# Patient Record
Sex: MALE | Race: BLACK OR AFRICAN AMERICAN | NOT HISPANIC OR LATINO | Employment: OTHER | URBAN - METROPOLITAN AREA
[De-identification: names, ages, dates, MRNs, and addresses within clinical notes are randomized per-mention and may not be internally consistent; named-entity substitution may affect disease eponyms.]

---

## 2017-07-03 ENCOUNTER — HOSPITAL ENCOUNTER (INPATIENT)
Facility: HOSPITAL | Age: 62
LOS: 4 days | Discharge: HOME | DRG: 603 | End: 2017-07-07
Attending: FAMILY MEDICINE | Admitting: SURGERY
Payer: MEDICARE

## 2017-07-03 DIAGNOSIS — L02.213 ABSCESS OF CHEST WALL: ICD-10-CM

## 2017-07-03 DIAGNOSIS — L02.91 ABSCESS: ICD-10-CM

## 2017-07-03 DIAGNOSIS — D84.9 IMMUNOSUPPRESSED STATUS: ICD-10-CM

## 2017-07-03 DIAGNOSIS — L03.313 CELLULITIS OF CHEST WALL: Primary | ICD-10-CM

## 2017-07-03 DIAGNOSIS — C91.10 CLL (CHRONIC LYMPHOCYTIC LEUKEMIA): ICD-10-CM

## 2017-07-03 PROBLEM — E11.9 TYPE 2 DIABETES MELLITUS: Status: ACTIVE | Noted: 2017-07-03

## 2017-07-03 LAB
ALBUMIN SERPL BCP-MCNC: 4 G/DL
ALP SERPL-CCNC: 99 U/L
ALT SERPL W/O P-5'-P-CCNC: 47 U/L
ANION GAP SERPL CALC-SCNC: 12 MMOL/L
AST SERPL-CCNC: 34 U/L
BASOPHILS NFR BLD: 0 %
BILIRUB SERPL-MCNC: 1.6 MG/DL
BILIRUB UR QL STRIP: NEGATIVE
BUN SERPL-MCNC: 19 MG/DL
CALCIUM SERPL-MCNC: 9.1 MG/DL
CHLORIDE SERPL-SCNC: 103 MMOL/L
CLARITY UR REFRACT.AUTO: CLEAR
CO2 SERPL-SCNC: 24 MMOL/L
COLOR UR AUTO: YELLOW
CREAT SERPL-MCNC: 1.7 MG/DL
DIFFERENTIAL METHOD: ABNORMAL
EOSINOPHIL NFR BLD: 1 %
ERYTHROCYTE [DISTWIDTH] IN BLOOD BY AUTOMATED COUNT: 14.4 %
EST. GFR  (AFRICAN AMERICAN): 48.9 ML/MIN/1.73 M^2
EST. GFR  (NON AFRICAN AMERICAN): 42.3 ML/MIN/1.73 M^2
GLUCOSE SERPL-MCNC: 231 MG/DL
GLUCOSE UR QL STRIP: NEGATIVE
HCT VFR BLD AUTO: 35.1 %
HGB BLD-MCNC: 11.6 G/DL
HGB UR QL STRIP: ABNORMAL
KETONES UR QL STRIP: NEGATIVE
LEUKOCYTE ESTERASE UR QL STRIP: NEGATIVE
LYMPHOCYTES NFR BLD: 59 %
MCH RBC QN AUTO: 29.7 PG
MCHC RBC AUTO-ENTMCNC: 33 %
MCV RBC AUTO: 90 FL
MICROSCOPIC COMMENT: ABNORMAL
MONOCYTES NFR BLD: 4 %
NEUTROPHILS NFR BLD: 36 %
NITRITE UR QL STRIP: NEGATIVE
PH UR STRIP: 7 [PH] (ref 5–8)
PLATELET # BLD AUTO: 115 K/UL
PMV BLD AUTO: 12 FL
POCT GLUCOSE: 165 MG/DL (ref 70–110)
POTASSIUM SERPL-SCNC: 4.3 MMOL/L
PROT SERPL-MCNC: 6.7 G/DL
PROT UR QL STRIP: NEGATIVE
RBC # BLD AUTO: 3.9 M/UL
RBC #/AREA URNS AUTO: 5 /HPF (ref 0–4)
SODIUM SERPL-SCNC: 139 MMOL/L
SP GR UR STRIP: 1.01 (ref 1–1.03)
URN SPEC COLLECT METH UR: ABNORMAL
UROBILINOGEN UR STRIP-ACNC: 1 EU/DL
WBC # BLD AUTO: 12.07 K/UL
WBC #/AREA URNS AUTO: 2 /HPF (ref 0–5)

## 2017-07-03 PROCEDURE — 25000003 PHARM REV CODE 250: Performed by: FAMILY MEDICINE

## 2017-07-03 PROCEDURE — 25000003 PHARM REV CODE 250: Performed by: PHYSICIAN ASSISTANT

## 2017-07-03 PROCEDURE — 87077 CULTURE AEROBIC IDENTIFY: CPT

## 2017-07-03 PROCEDURE — 0J963ZZ DRAINAGE OF CHEST SUBCUTANEOUS TISSUE AND FASCIA, PERCUTANEOUS APPROACH: ICD-10-PCS | Performed by: SURGERY

## 2017-07-03 PROCEDURE — 85027 COMPLETE CBC AUTOMATED: CPT

## 2017-07-03 PROCEDURE — 63600175 PHARM REV CODE 636 W HCPCS: Performed by: PHYSICIAN ASSISTANT

## 2017-07-03 PROCEDURE — 11000001 HC ACUTE MED/SURG PRIVATE ROOM

## 2017-07-03 PROCEDURE — 87040 BLOOD CULTURE FOR BACTERIA: CPT

## 2017-07-03 PROCEDURE — 87186 SC STD MICRODIL/AGAR DIL: CPT

## 2017-07-03 PROCEDURE — 96368 THER/DIAG CONCURRENT INF: CPT

## 2017-07-03 PROCEDURE — 94761 N-INVAS EAR/PLS OXIMETRY MLT: CPT

## 2017-07-03 PROCEDURE — 85007 BL SMEAR W/DIFF WBC COUNT: CPT

## 2017-07-03 PROCEDURE — 25000003 PHARM REV CODE 250: Performed by: EMERGENCY MEDICINE

## 2017-07-03 PROCEDURE — 80053 COMPREHEN METABOLIC PANEL: CPT

## 2017-07-03 PROCEDURE — 99285 EMERGENCY DEPT VISIT HI MDM: CPT | Mod: 25

## 2017-07-03 PROCEDURE — 96365 THER/PROPH/DIAG IV INF INIT: CPT

## 2017-07-03 PROCEDURE — 36415 COLL VENOUS BLD VENIPUNCTURE: CPT

## 2017-07-03 PROCEDURE — 63600175 PHARM REV CODE 636 W HCPCS: Performed by: FAMILY MEDICINE

## 2017-07-03 PROCEDURE — 25000003 PHARM REV CODE 250

## 2017-07-03 PROCEDURE — 87070 CULTURE OTHR SPECIMN AEROBIC: CPT

## 2017-07-03 PROCEDURE — 96366 THER/PROPH/DIAG IV INF ADDON: CPT

## 2017-07-03 PROCEDURE — 81000 URINALYSIS NONAUTO W/SCOPE: CPT

## 2017-07-03 RX ORDER — ONDANSETRON 8 MG/1
8 TABLET, ORALLY DISINTEGRATING ORAL EVERY 8 HOURS PRN
Status: DISCONTINUED | OUTPATIENT
Start: 2017-07-03 | End: 2017-07-07 | Stop reason: HOSPADM

## 2017-07-03 RX ORDER — HYDROCODONE BITARTRATE AND ACETAMINOPHEN 5; 325 MG/1; MG/1
1 TABLET ORAL EVERY 4 HOURS PRN
Status: DISCONTINUED | OUTPATIENT
Start: 2017-07-03 | End: 2017-07-07 | Stop reason: HOSPADM

## 2017-07-03 RX ORDER — DORZOLAMIDE HYDROCHLORIDE AND TIMOLOL MALEATE 20; 5 MG/ML; MG/ML
1 SOLUTION/ DROPS OPHTHALMIC 2 TIMES DAILY
COMMUNITY

## 2017-07-03 RX ORDER — LIDOCAINE HYDROCHLORIDE 10 MG/ML
10 INJECTION INFILTRATION; PERINEURAL ONCE
Status: COMPLETED | OUTPATIENT
Start: 2017-07-03 | End: 2017-07-03

## 2017-07-03 RX ORDER — NAPROXEN SODIUM 220 MG/1
81 TABLET, FILM COATED ORAL DAILY
Status: DISCONTINUED | OUTPATIENT
Start: 2017-07-04 | End: 2017-07-07 | Stop reason: HOSPADM

## 2017-07-03 RX ORDER — IBUPROFEN 200 MG
16 TABLET ORAL
Status: DISCONTINUED | OUTPATIENT
Start: 2017-07-03 | End: 2017-07-05 | Stop reason: DRUGHIGH

## 2017-07-03 RX ORDER — IBUPROFEN 200 MG
24 TABLET ORAL
Status: DISCONTINUED | OUTPATIENT
Start: 2017-07-03 | End: 2017-07-05 | Stop reason: DRUGHIGH

## 2017-07-03 RX ORDER — GLUCAGON 1 MG
1 KIT INJECTION
Status: DISCONTINUED | OUTPATIENT
Start: 2017-07-03 | End: 2017-07-05 | Stop reason: DRUGHIGH

## 2017-07-03 RX ORDER — INSULIN ASPART 100 [IU]/ML
1-10 INJECTION, SOLUTION INTRAVENOUS; SUBCUTANEOUS
Status: DISCONTINUED | OUTPATIENT
Start: 2017-07-03 | End: 2017-07-05 | Stop reason: DRUGHIGH

## 2017-07-03 RX ORDER — AMLODIPINE BESYLATE 5 MG/1
10 TABLET ORAL DAILY
Status: DISCONTINUED | OUTPATIENT
Start: 2017-07-04 | End: 2017-07-07 | Stop reason: HOSPADM

## 2017-07-03 RX ORDER — ATORVASTATIN CALCIUM 20 MG/1
20 TABLET, FILM COATED ORAL DAILY
Status: DISCONTINUED | OUTPATIENT
Start: 2017-07-04 | End: 2017-07-07 | Stop reason: HOSPADM

## 2017-07-03 RX ORDER — DIPHENHYDRAMINE HYDROCHLORIDE 50 MG/ML
25 INJECTION INTRAMUSCULAR; INTRAVENOUS EVERY 4 HOURS PRN
Status: DISCONTINUED | OUTPATIENT
Start: 2017-07-03 | End: 2017-07-07 | Stop reason: HOSPADM

## 2017-07-03 RX ORDER — ENOXAPARIN SODIUM 100 MG/ML
40 INJECTION SUBCUTANEOUS
Status: DISCONTINUED | OUTPATIENT
Start: 2017-07-03 | End: 2017-07-07 | Stop reason: HOSPADM

## 2017-07-03 RX ORDER — SODIUM CHLORIDE 0.9 % (FLUSH) 0.9 %
3 SYRINGE (ML) INJECTION EVERY 8 HOURS
Status: DISCONTINUED | OUTPATIENT
Start: 2017-07-03 | End: 2017-07-07 | Stop reason: HOSPADM

## 2017-07-03 RX ORDER — ACETAMINOPHEN 325 MG/1
650 TABLET ORAL EVERY 8 HOURS PRN
Status: DISCONTINUED | OUTPATIENT
Start: 2017-07-03 | End: 2017-07-07 | Stop reason: HOSPADM

## 2017-07-03 RX ADMIN — PIPERACILLIN AND TAZOBACTAM 4.5 G: 4; .5 INJECTION, POWDER, FOR SOLUTION INTRAVENOUS at 03:07

## 2017-07-03 RX ADMIN — SODIUM CHLORIDE, PRESERVATIVE FREE 3 ML: 5 INJECTION INTRAVENOUS at 09:07

## 2017-07-03 RX ADMIN — VANCOMYCIN HYDROCHLORIDE 1 G: 1 INJECTION, POWDER, LYOPHILIZED, FOR SOLUTION INTRAVENOUS at 02:07

## 2017-07-03 RX ADMIN — LIDOCAINE HYDROCHLORIDE 10 ML: 10 INJECTION, SOLUTION INFILTRATION; PERINEURAL at 06:07

## 2017-07-03 NOTE — ED NOTES
Received from [facility], located within [facility], pt has [id number] area of raised redness with drainage, area includes nipple.. Area below is blistered from tape per pt..iv antibiotics completed pta, denies chills, + pain, Dr Bearden on phone with dr Ortiz resident.

## 2017-07-03 NOTE — ED PROVIDER NOTES
Encounter Date: 7/3/2017       History     Chief Complaint   Patient presents with    Abscess     Pt reports I&D to abscess under left axilla chest area on 6/29/2017. [facility] Pt reports fever.     HPI: 61 YO M with a  PMH of CLL, diabetes, gluacoma, HTN and hyperlipidemia presents to the ED with a CC of abscess to the chest wall. Patient states that he went to [facility] on 6/28/17 for any abscess to the left axilla/chest wall. The area was incised and drained in the ED and packed, states he has been taking Bactrim and removed the packing on 7/1 as instructed. [facility] and was sent to the ED due to worsening infection. Redness has extended on the chest wall, the area is continuing to drain and patient experiencing fever at home and night sweats. He denies any nausea or vomiting, no chest pain or shortness of breath. Patient is currently taking oral chemo for hx of CLL.           Review of patient's allergies indicates:  No Known Allergies  Past Medical History:   Diagnosis Date    CLL (chronic lymphocytic leukemia)     Diabetes mellitus     Glaucoma     High cholesterol     Hypertension      History reviewed. No pertinent surgical history.  History reviewed. No pertinent family history.  Social History   Substance Use Topics    Smoking status: Never Smoker    Smokeless tobacco: Never Used    Alcohol use No     Review of Systems   Constitutional: Positive for chills and fever.   HENT: Negative for ear discharge and sore throat.    Eyes: Negative for visual disturbance.   Respiratory: Negative for shortness of breath.    Cardiovascular: Negative for chest pain.   Gastrointestinal: Negative for abdominal pain, nausea and vomiting.   Genitourinary: Negative for dysuria.   Musculoskeletal: Negative for back pain.   Skin: Positive for wound.        See HPI   Neurological: Negative for dizziness, weakness and numbness.   Hematological:  Does not bruise/bleed easily.   All other systems reviewed and are negative.      Physical Exam     Initial Vitals [07/03/17 1323]   BP Pulse Resp Temp SpO2   (!) 142/68 86 18 97.7 °F (36.5 °C) 98 %      MAP       92.67         Physical Exam    Nursing note and vitals reviewed.  Constitutional: He appears well-developed and well-nourished. No distress.    male laying on exam bed   HENT:   Head: Normocephalic and atraumatic.   Right Ear: External ear normal.   Left Ear: External ear normal.   Mouth/Throat: Oropharynx is clear and moist.   Eyes: Conjunctivae and EOM are normal. Pupils are equal, round, and reactive to light.   Neck: Normal range of motion. Neck supple.   Cardiovascular: Normal rate, regular rhythm, normal heart sounds and intact distal pulses.   Pulmonary/Chest: Breath sounds normal. No respiratory distress. He has no wheezes. He exhibits tenderness.       Abdominal: Soft. Bowel sounds are normal. He exhibits no distension. There is no tenderness. There is no rebound and no guarding.   Musculoskeletal: Normal range of motion.   Lymphadenopathy:     He has no cervical adenopathy.   Neurological: He is alert and oriented to person, place, and time. He has normal strength. No sensory deficit.   Skin: Capillary refill takes less than 2 seconds.        Patient has a small incision to the left lateral chest wall just inferior to the left axilla draining minimally purulent drainage surrounded by indurated cellulitic tissue. Patient does have 3 small open areas just inferior to the cellulitis that appear to have been blisters that opened.          ED Course   Procedures  Labs Reviewed   CULTURE, BLOOD   CBC W/ AUTO DIFFERENTIAL   COMPREHENSIVE METABOLIC PANEL   URINALYSIS             Medical Decision Making:   Initial Assessment:   61 YO M with a  PMH of CLL, diabetes, gluacoma, HTN and hyperlipidemia presents to the ED with a CC of abscess to the chest wall. Patient states that he went to [facility] on 6/28/17 for any abscess to the left axilla/chest wall. The area was incised and drained in the ED and packed, states he has been taking Bactrim and removed the packing on 7/1 as instructed. [facility] and was sent to the ED due to worsening infection. Redness has extended on the chest wall, the area is continuing to drain and patient experiencing fever at home and night sweats. He denies any nausea or vomiting, no chest pain or shortness of breath. Patient is currently taking oral chemo for hx of CLL.  Differential Diagnosis:   Cellulitis, abscess, sepsis  Clinical Tests:   Lab Tests: Ordered  Radiological Study: Ordered  ED Management:  Lab work ordered. Patient failed outpatient management. Blood cultures drawn, patient is a candidate for inpatient IV antibiotics.  Other:   I have discussed this case with another health care provider.       <> Summary of the Discussion: Patient status is discussed with Dr. weaver over advised to contact surgery directly as they  directly admit patients.  Patient case is discussed with Dr. Fierro who accepted the patient to be transferred to [place] ER for evaluation.                   ED Course     Clinical Impression:   The primary encounter diagnosis was Axillary abscess. A diagnosis of Cellulitis of chest wall was also pertinent to this visit.    Disposition:   Disposition: Transferred  [place] ED for evaluation by surgery.                        Steven Das MD  07/03/17 2325

## 2017-07-03 NOTE — Clinical Note
Patient is accepted by Dr. Fierro for evaluation.  Advised to send to the ER for evaluation.  Dr. Bearden in [place] ER and notified about the patient.

## 2017-07-03 NOTE — ED TRIAGE NOTES
Pt reports I&D to abscess under left axilla chest area on 6/29/2017. [facility]. Pt reports fever.

## 2017-07-03 NOTE — CONSULTS
"[facility]  History & Physical  Surgery    SUBJECTIVE:     Chief Complaint/Reason for Admission: The patient is a 62 y.o. male Hx diabetes mod controlled with insulin and CLL on ibrutinib. Was doing well until last Wednesday, noticed a "pimple" on L chest. This began to swell and enlarge. An I&D was performed at OSH on 6/29 and he was sent out on bactrim. He removed the packing and dressing as instructed, but the swelling, pain, and erythema worsening and purulent drainage began to come from the previous incision. States he has subjective fevers at home. Denies chills, night sweats, Cp, SOB, abd pain.     History of Present Illness:  Review of patient's allergies indicates:  No Known Allergies       Past Medical History:   Diagnosis Date    CLL (chronic lymphocytic leukemia)     Diabetes mellitus     Glaucoma     High cholesterol     Hypertension       History reviewed. No pertinent surgical history.   History reviewed. No pertinent family history.   Social History   Substance Use Topics    Smoking status: Never Smoker    Smokeless tobacco: Never Used    Alcohol use No          Review of Systems:  Pertinent items are noted in HPI.    OBJECTIVE:     Vital signs in last 24 hours:  Temp:  [97.7 °F (36.5 °C)-98.5 °F (36.9 °C)] 98.5 °F (36.9 °C)  Pulse:  [75-86] 75  Resp:  [18] 18  SpO2:  [98 %-100 %] 100 %  BP: (128-142)/(68-75) 128/75    /75 (BP Location: Right arm, Patient Position: Sitting)   Pulse 75   Temp 98.5 °F (36.9 °C) (Oral)   Resp 18   Ht 6' 1" (1.854 m)   Wt 101.6 kg (224 lb)   SpO2 100%   BMI 29.55 kg/m²     General Appearance:    Alert, cooperative, no distress, appears stated age   Head:    Normocephalic, without obvious abnormality, atraumatic   Neck:   Supple, symmetrical, trachea midline, no adenopathy;        thyroid:  No enlargement/tenderness/nodules; no carotid    bruit or JVD   Back:     Symmetric, no curvature, ROM normal, no CVA tenderness   Lungs:     " Clear to auscultation bilaterally, respirations unlabored   Chest wall:    L chest 7cm x 3cm area of induration and fluctuance, surrounded by 10cm x 13cm area of erythema and cellulitis. 1cm incision with purulent drainage, no local lymph nodes palpable   Heart:    Regular rate and rhythm, S1 and S2 normal, no murmur, rub   or gallop   Abdomen:     Soft, non-tender, bowel sounds active all four quadrants,     no masses, no organomegaly   Extremities:   Extremities normal, atraumatic, no cyanosis or edema   Pulses:   2+ and symmetric all extremities   Skin:   Skin color, texture, turgor normal, no rashes or lesions   Lymph nodes:   Cervical, supraclavicular, and axillary nodes normal   Neurologic:   CNII-XII intact. Normal strength, sensation and reflexes       throughout         Labs:  WBC 12  H/H 11/35  Glucose 231  Cr 1.7  T bili 1.6  GFR 48.9    All other labs wnl    History  Exam  Assessment & Plan    Tony Harris  7/3/2017    ASSESSMENT/PLAN:   63yo M Hx DM and CLL with recurrent L chest wall abscess    1. Admit Surgery  2. IV Abx  3. Bedside I&D  4. Consents obtained  5. Diabetic diet  6. Pain control    Tony Harris MD

## 2017-07-03 NOTE — ED PROVIDER NOTES
Encounter Date: 7/3/2017       History     Chief Complaint   Patient presents with    Abscess     Pt reports I&D to abscess under left axilla chest area on 6/29/2017. [facility]. Pt reports fever.     Patient is a 62-year-old male transferred here from [facility] emergency department for evaluation of a left chest wall abscess.  Patient had an incision and drainage done a few days ago at [place] emergency department.  He presented to [facility] emergency department today and was then sent here.  Patient reports subjective fevers at home last night.  He is currently on Bactrim.        The history is provided by the patient and the EMS personnel.     Review of patient's allergies indicates:  No Known Allergies  Past Medical History:   Diagnosis Date    CLL (chronic lymphocytic leukemia)     Diabetes mellitus     Glaucoma     High cholesterol     Hypertension      History reviewed. No pertinent surgical history.  History reviewed. No pertinent family history.  Social History   Substance Use Topics    Smoking status: Never Smoker    Smokeless tobacco: Never Used    Alcohol use No     Review of Systems   Constitutional:        Subjective fever.   Respiratory: Negative for shortness of breath.    Gastrointestinal: Negative for vomiting.   Skin: Positive for color change and wound.       Physical Exam     Initial Vitals [07/03/17 1323]   BP Pulse Resp Temp SpO2   (!) 142/68 86 18 97.7 °F (36.5 °C) 98 %      MAP       92.67         Physical Exam    Nursing note and vitals reviewed.  Constitutional: No distress.   HENT:   Head: Normocephalic and atraumatic.   Eyes: EOM are normal.   Neck: Neck supple.   Cardiovascular: Normal rate, regular rhythm and normal heart sounds.   Pulmonary/Chest: Breath sounds normal.   Abdominal: Soft. There is no tenderness.   Musculoskeletal: Normal range of motion.   Neurological: He is alert and oriented to person, place, and time.   Skin:    Erythema and induration of the left chest wall.  (See picture below.)   Psychiatric: His behavior is normal. Thought content normal.             ED Course   Procedures  Labs Reviewed   CBC W/ AUTO DIFFERENTIAL - Abnormal; Notable for the following:        Result Value    RBC 3.90 (*)     Hemoglobin 11.6 (*)     Hematocrit 35.1 (*)     Platelets 115 (*)     Gran% 36.0 (*)     Lymph% 59.0 (*)     All other components within normal limits   COMPREHENSIVE METABOLIC PANEL - Abnormal; Notable for the following:     Glucose 231 (*)     Creatinine 1.70 (*)     Total Bilirubin 1.6 (*)     ALT 47 (*)     eGFR if  48.9 (*)     eGFR if non  42.3 (*)     All other components within normal limits   URINALYSIS - Abnormal; Notable for the following:     Occult Blood UA 1+ (*)     All other components within normal limits   URINALYSIS MICROSCOPIC - Abnormal; Notable for the following:     RBC, UA 5 (*)     All other components within normal limits   CULTURE, BLOOD   CULTURE, AEROBIC  (SPECIFY SOURCE)                               ED Course     Clinical Impression:   The primary encounter diagnosis was Cellulitis of chest wall. Diagnoses of Abscess of chest wall and Abscess were also pertinent to this visit.                           Esvin Bearden MD  07/04/17 0705

## 2017-07-04 PROBLEM — L02.213 ABSCESS OF CHEST WALL: Status: ACTIVE | Noted: 2017-07-04

## 2017-07-04 LAB
ANION GAP SERPL CALC-SCNC: 8 MMOL/L
ANISOCYTOSIS BLD QL SMEAR: SLIGHT
BASOPHILS # BLD AUTO: ABNORMAL K/UL
BASOPHILS NFR BLD: 0 %
BUN SERPL-MCNC: 20 MG/DL
CALCIUM SERPL-MCNC: 9.2 MG/DL
CHLORIDE SERPL-SCNC: 105 MMOL/L
CO2 SERPL-SCNC: 23 MMOL/L
CREAT SERPL-MCNC: 1.9 MG/DL
DIFFERENTIAL METHOD: ABNORMAL
EOSINOPHIL # BLD AUTO: ABNORMAL K/UL
EOSINOPHIL NFR BLD: 0 %
ERYTHROCYTE [DISTWIDTH] IN BLOOD BY AUTOMATED COUNT: 14.4 %
EST. GFR  (AFRICAN AMERICAN): 43 ML/MIN/1.73 M^2
EST. GFR  (NON AFRICAN AMERICAN): 37 ML/MIN/1.73 M^2
ESTIMATED AVG GLUCOSE: 180 MG/DL
ESTIMATED AVG GLUCOSE: 180 MG/DL
GLUCOSE SERPL-MCNC: 251 MG/DL
HBA1C MFR BLD HPLC: 7.9 %
HBA1C MFR BLD HPLC: 7.9 %
HCT VFR BLD AUTO: 33.4 %
HGB BLD-MCNC: 11.1 G/DL
HYPOCHROMIA BLD QL SMEAR: ABNORMAL
LYMPHOCYTES # BLD AUTO: ABNORMAL K/UL
LYMPHOCYTES NFR BLD: 54 %
MCH RBC QN AUTO: 29.5 PG
MCHC RBC AUTO-ENTMCNC: 33.2 %
MCV RBC AUTO: 89 FL
MONOCYTES # BLD AUTO: ABNORMAL K/UL
MONOCYTES NFR BLD: 2 %
NEUTROPHILS NFR BLD: 44 %
PLATELET # BLD AUTO: 116 K/UL
PLATELET BLD QL SMEAR: ABNORMAL
PMV BLD AUTO: 11.7 FL
POCT GLUCOSE: 170 MG/DL (ref 70–110)
POCT GLUCOSE: 235 MG/DL (ref 70–110)
POCT GLUCOSE: 242 MG/DL (ref 70–110)
POCT GLUCOSE: 245 MG/DL (ref 70–110)
POCT GLUCOSE: 249 MG/DL (ref 70–110)
POTASSIUM SERPL-SCNC: 4.5 MMOL/L
RBC # BLD AUTO: 3.76 M/UL
SODIUM SERPL-SCNC: 136 MMOL/L
WBC # BLD AUTO: 10.37 K/UL

## 2017-07-04 PROCEDURE — 94761 N-INVAS EAR/PLS OXIMETRY MLT: CPT

## 2017-07-04 PROCEDURE — 85025 COMPLETE CBC W/AUTO DIFF WBC: CPT

## 2017-07-04 PROCEDURE — 63600175 PHARM REV CODE 636 W HCPCS: Performed by: SURGERY

## 2017-07-04 PROCEDURE — 25000003 PHARM REV CODE 250: Performed by: SURGERY

## 2017-07-04 PROCEDURE — 36415 COLL VENOUS BLD VENIPUNCTURE: CPT

## 2017-07-04 PROCEDURE — 25000003 PHARM REV CODE 250

## 2017-07-04 PROCEDURE — 80048 BASIC METABOLIC PNL TOTAL CA: CPT

## 2017-07-04 PROCEDURE — 83036 HEMOGLOBIN GLYCOSYLATED A1C: CPT

## 2017-07-04 PROCEDURE — 11000001 HC ACUTE MED/SURG PRIVATE ROOM

## 2017-07-04 PROCEDURE — 63600175 PHARM REV CODE 636 W HCPCS

## 2017-07-04 RX ADMIN — SODIUM CHLORIDE, PRESERVATIVE FREE 3 ML: 5 INJECTION INTRAVENOUS at 02:07

## 2017-07-04 RX ADMIN — PIPERACILLIN SODIUM AND TAZOBACTAM SODIUM 4.5 G: 4; .5 INJECTION, POWDER, FOR SOLUTION INTRAVENOUS at 11:07

## 2017-07-04 RX ADMIN — AMLODIPINE BESYLATE 10 MG: 5 TABLET ORAL at 08:07

## 2017-07-04 RX ADMIN — ATORVASTATIN CALCIUM 20 MG: 20 TABLET, FILM COATED ORAL at 08:07

## 2017-07-04 RX ADMIN — SODIUM CHLORIDE, PRESERVATIVE FREE 3 ML: 5 INJECTION INTRAVENOUS at 08:07

## 2017-07-04 RX ADMIN — PIPERACILLIN SODIUM AND TAZOBACTAM SODIUM 4.5 G: 4; .5 INJECTION, POWDER, FOR SOLUTION INTRAVENOUS at 12:07

## 2017-07-04 RX ADMIN — ASPIRIN 81 MG 81 MG: 81 TABLET ORAL at 08:07

## 2017-07-04 RX ADMIN — PIPERACILLIN SODIUM AND TAZOBACTAM SODIUM 4.5 G: 4; .5 INJECTION, POWDER, FOR SOLUTION INTRAVENOUS at 04:07

## 2017-07-04 RX ADMIN — VANCOMYCIN HYDROCHLORIDE 1250 MG: 750 INJECTION, POWDER, LYOPHILIZED, FOR SOLUTION INTRAVENOUS at 02:07

## 2017-07-04 RX ADMIN — ACETAMINOPHEN 650 MG: 325 TABLET ORAL at 08:07

## 2017-07-04 RX ADMIN — INSULIN ASPART 4 UNITS: 100 INJECTION, SOLUTION INTRAVENOUS; SUBCUTANEOUS at 05:07

## 2017-07-04 RX ADMIN — SODIUM CHLORIDE, PRESERVATIVE FREE 3 ML: 5 INJECTION INTRAVENOUS at 05:07

## 2017-07-04 RX ADMIN — PIPERACILLIN SODIUM AND TAZOBACTAM SODIUM 4.5 G: 4; .5 INJECTION, POWDER, FOR SOLUTION INTRAVENOUS at 07:07

## 2017-07-04 RX ADMIN — INSULIN ASPART 2 UNITS: 100 INJECTION, SOLUTION INTRAVENOUS; SUBCUTANEOUS at 08:07

## 2017-07-04 RX ADMIN — ENOXAPARIN SODIUM 40 MG: 100 INJECTION SUBCUTANEOUS at 08:07

## 2017-07-04 RX ADMIN — INSULIN ASPART 4 UNITS: 100 INJECTION, SOLUTION INTRAVENOUS; SUBCUTANEOUS at 04:07

## 2017-07-04 NOTE — PROGRESS NOTES
[facility]  General Surgery  Progress Note    Subjective:     CC: L chest abscess      S: 62 post procedure day #1 s/p incision and drain of left chest abscess. No fevers, still with cellulitis to left chest. On vanc zosyn.          Post-Op Info:  * No surgery found *          Medications:  Continuous Infusions:   Scheduled Meds:   amlodipine  10 mg Oral Daily    aspirin  81 mg Oral Daily    atorvastatin  20 mg Oral Daily    enoxaparin  40 mg Subcutaneous Q24H    piperacillin-tazobactam 4.5 g in dextrose 5 % 100 mL IVPB (ready to mix system)  4.5 g Intravenous Q8H    sodium chloride 0.9%  3 mL Intravenous Q8H    vancomycin (VANCOCIN) IVPB  1,250 mg Intravenous Q24H     PRN Meds:acetaminophen, dextrose 50%, dextrose 50%, diphenhydrAMINE, glucagon (human recombinant), glucose, glucose, hydrocodone-acetaminophen 5-325mg, insulin aspart, ondansetron     Objective:     Vital Signs (Most Recent):  Temp: 99.7 °F (37.6 °C) (07/04/17 0727)  Pulse: 75 (07/04/17 0727)  Resp: 18 (07/04/17 0727)  BP: (!) 142/69 (07/04/17 0727)  SpO2: 98 % (07/04/17 0628) Vital Signs (24h Range):  Temp:  [97.7 °F (36.5 °C)-99.8 °F (37.7 °C)] 99.7 °F (37.6 °C)  Pulse:  [73-86] 75  Resp:  [17-18] 18  SpO2:  [96 %-100 %] 98 %  BP: (110-142)/(55-75) 142/69     No intake or output data in the 24 hours ending 07/04/17 0804    Physical Exam    Gen: NAD  HEENT: NCAT  Cv: RRR  Lungs: CTABL  Abd: Soft NT ND  Left chest with cellulitis from axilla extending to anterior chest wall, measuring [id number], incision to left mid axillary line with packing, scant drainage on dressing      Assessment/Plan:     Active Diagnoses:    Diagnosis Date Noted POA    Abscess [L02.91] 07/03/2017 Unknown    Type 2 diabetes mellitus [E11.9] 07/03/2017 Unknown    Cellulitis of chest wall [L03.313] 07/03/2017 Yes      Problems Resolved During this Admission:    Diagnosis Date Noted Date Resolved POA     62 M with left chest abscess  -cont vanc  zosyn  -f/u wound cultures  -diabetic diet  -SSI  -change packing tomorrow    Travis Gupta MD  General Surgery  [facility]

## 2017-07-04 NOTE — ED NOTES
Assumed care. Awake, alert, oriented. Dressing to chest wall dry and intact. Voicing no needs. IV antibiotics infusing to right peripheral IV well. No redness or irritation at site. Admit orders written. Bed assignment pending.

## 2017-07-04 NOTE — PLAN OF CARE
Pt AAOx3, independent with adl's, no HH or DME needs prior to admit.         07/04/17 1512   Discharge Assessment   Assessment Type Discharge Planning Assessment   Confirmed/corrected address and phone number on facesheet? Yes   Assessment information obtained from? Patient   Prior to hospitalization functional status: Independent   Current cognitive status: Alert/Oriented   Current Functional Status: Independent   Arrived From admitted as an inpatient   Lives With Spouse Ely   Able to Return to Prior Arrangements yes   Is patient able to care for self after discharge? Yes   Who are your caregiver(s) and their phone number(s)? wife ely or mother in law- [facility]- [phone]   Patient's perception of discharge disposition home or selfcare   Readmission Within The Last 30 Days no previous admission in last 30 days   Patient currently being followed by outpatient case management? No   Patient currently receives home health services? No   Does the patient currently use HME? No   Patient currently receives private duty nursing? N/A   Equipment Currently Used at Home none   Do you have any problems affording any of your prescribed medications? No   Is the patient taking medications as prescribed? yes   Do you have any financial concerns preventing you from receiving the healthcare you need? No   Does the patient have transportation to healthcare appointments? No   On Dialysis? No   Does the patient receive services at the Coumadin Clinic? No   Are there any open cases? No   Discharge Plan A Home with family   Discharge Plan B Home with family;Home Health   Patient/Family In Agreement With Plan yes

## 2017-07-04 NOTE — H&P
"[facility]  History & Physical  Surgery     SUBJECTIVE:      Chief Complaint/Reason for Admission: The patient is a 62 y.o. male Hx diabetes mod controlled with insulin and CLL on ibrutinib. Was doing well until last Wednesday, noticed a "pimple" on L chest. This began to swell and enlarge. An I&D was performed at OSH on 6/29 and he was sent out on bactrim. He removed the packing and dressing as instructed, but the swelling, pain, and erythema worsening and purulent drainage began to come from the previous incision. States he has subjective fevers at home. Denies chills, night sweats, Cp, SOB, abd pain.      History of Present Illness:  Review of patient's allergies indicates:  No Known Allergies              Past Medical History:   Diagnosis Date    CLL (chronic lymphocytic leukemia)      Diabetes mellitus      Glaucoma      High cholesterol      Hypertension        History reviewed. No pertinent surgical history.   History reviewed. No pertinent family history.        Social History   Substance Use Topics    Smoking status: Never Smoker    Smokeless tobacco: Never Used    Alcohol use No            Review of Systems:  Pertinent items are noted in HPI.     OBJECTIVE:      Vital signs in last 24 hours:  Temp:  [97.7 °F (36.5 °C)-98.5 °F (36.9 °C)] 98.5 °F (36.9 °C)  Pulse:  [75-86] 75  Resp:  [18] 18  SpO2:  [98 %-100 %] 100 %  BP: (128-142)/(68-75) 128/75     /75 (BP Location: Right arm, Patient Position: Sitting)   Pulse 75   Temp 98.5 °F (36.9 °C) (Oral)   Resp 18   Ht 6' 1" (1.854 m)   Wt 101.6 kg (224 lb)   SpO2 100%   BMI 29.55 kg/m²      General Appearance:    Alert, cooperative, no distress, appears stated age   Head:    Normocephalic, without obvious abnormality, atraumatic   Neck:   Supple, symmetrical, trachea midline, no adenopathy;        thyroid:  No enlargement/tenderness/nodules; no carotid    bruit or JVD   Back:     Symmetric, no curvature, ROM normal, no CVA " tenderness   Lungs:     Clear to auscultation bilaterally, respirations unlabored   Chest wall:    L chest 7cm x 3cm area of induration and fluctuance, surrounded by 10cm x 13cm area of erythema and cellulitis. 1cm incision with purulent drainage, no local lymph nodes palpable   Heart:    Regular rate and rhythm, S1 and S2 normal, no murmur, rub   or gallop   Abdomen:     Soft, non-tender, bowel sounds active all four quadrants,     no masses, no organomegaly   Extremities:   Extremities normal, atraumatic, no cyanosis or edema   Pulses:   2+ and symmetric all extremities   Skin:   Skin color, texture, turgor normal, no rashes or lesions   Lymph nodes:   Cervical, supraclavicular, and axillary nodes normal   Neurologic:   CNII-XII intact. Normal strength, sensation and reflexes       throughout            Labs:  WBC 12  H/H 11/35  Glucose 231  Cr 1.7  T bili 1.6  GFR 48.9     All other labs wnl     ASSESSMENT/PLAN:   63yo M Hx DM and CLL with recurrent L chest wall abscess     1. Admit Surgery  2. IV Abx  3. Bedside I&D  4. Consents obtained  5. Diabetic diet  6. Pain control  7. Sliding scale     Tony Harris MD

## 2017-07-04 NOTE — PROGRESS NOTES
Pharmacy New Medication Education     Patient accepted medication education.     Pharmacy educated patient on the following medications, using the teach-back method.     Apap  Norvasc  Asa  Lipitor  D50%  Benadryl  Lovenox  Glucagon  Glucose  Norco  Novolog  Zofran  Zosyn  vancomycin    Learners of pharmacy medication education included:  patient     Patient +/- learner response:  verbalize understanding

## 2017-07-04 NOTE — PROGRESS NOTES
L axilla/chest abscess drained under local anesthesia without complications.  Packed with iodophor gauze    Wound packed.  Cultures pending  Continue antibiotics, analgesics

## 2017-07-04 NOTE — PLAN OF CARE
Problem: Patient Care Overview  Goal: Plan of Care Review  Patient is AAOx4 and in NAD. He inadvertently pulled his dressing off from IND cyst. Dressing replaced and re-enforced, pt provided with clean gown. Blood sugar maintained. Scheduled antibiotic given, pt tolerated well. He denies any pain or nausea. No need or want voiced at this time. Will continue to monitor.

## 2017-07-04 NOTE — ED NOTES
Remains awake, alert. Bed assigned and patient updated on room number. Spouse updated on POC by phone.

## 2017-07-04 NOTE — PLAN OF CARE
Problem: Patient Care Overview  Goal: Plan of Care Review  Outcome: Revised  Patient is awake, alert, and oriented.  Pleasant.  Patient's left chest wound dressing changed after patient had a bath.  Patient continues to receive antibiotics.  Family members visited.  No complaints of pain.  Last blood sugar was  250. Novolog Insulin per sliding scale given.  Resting quietly in bed.  Safety maintained.  Will continue to monitor.

## 2017-07-05 PROBLEM — E78.5 HYPERLIPIDEMIA: Status: ACTIVE | Noted: 2017-07-05

## 2017-07-05 PROBLEM — Z79.4 TYPE 2 DIABETES MELLITUS WITH STAGE 3 CHRONIC KIDNEY DISEASE, WITH LONG-TERM CURRENT USE OF INSULIN: Status: ACTIVE | Noted: 2017-07-03

## 2017-07-05 PROBLEM — D84.9 IMMUNOSUPPRESSED STATUS: Status: ACTIVE | Noted: 2017-07-05

## 2017-07-05 PROBLEM — I10 ESSENTIAL HYPERTENSION: Status: ACTIVE | Noted: 2017-07-05

## 2017-07-05 PROBLEM — C91.10 CLL (CHRONIC LYMPHOCYTIC LEUKEMIA): Status: ACTIVE | Noted: 2017-07-05

## 2017-07-05 PROBLEM — E11.22 TYPE 2 DIABETES MELLITUS WITH STAGE 3 CHRONIC KIDNEY DISEASE, WITH LONG-TERM CURRENT USE OF INSULIN: Status: ACTIVE | Noted: 2017-07-03

## 2017-07-05 PROBLEM — N18.30 TYPE 2 DIABETES MELLITUS WITH STAGE 3 CHRONIC KIDNEY DISEASE, WITH LONG-TERM CURRENT USE OF INSULIN: Status: ACTIVE | Noted: 2017-07-03

## 2017-07-05 LAB
ALBUMIN SERPL BCP-MCNC: 3.3 G/DL
ALP SERPL-CCNC: 97 U/L
ALT SERPL W/O P-5'-P-CCNC: 22 U/L
ANION GAP SERPL CALC-SCNC: 10 MMOL/L
AST SERPL-CCNC: 14 U/L
BASOPHILS # BLD AUTO: 0.01 K/UL
BASOPHILS NFR BLD: 0.1 %
BILIRUB SERPL-MCNC: 2 MG/DL
BUN SERPL-MCNC: 20 MG/DL
CALCIUM SERPL-MCNC: 9.3 MG/DL
CHLORIDE SERPL-SCNC: 104 MMOL/L
CO2 SERPL-SCNC: 23 MMOL/L
CREAT SERPL-MCNC: 1.9 MG/DL
DIFFERENTIAL METHOD: ABNORMAL
EOSINOPHIL # BLD AUTO: 0 K/UL
EOSINOPHIL NFR BLD: 0.6 %
ERYTHROCYTE [DISTWIDTH] IN BLOOD BY AUTOMATED COUNT: 14.2 %
EST. GFR  (AFRICAN AMERICAN): 43 ML/MIN/1.73 M^2
EST. GFR  (NON AFRICAN AMERICAN): 37 ML/MIN/1.73 M^2
GLUCOSE SERPL-MCNC: 193 MG/DL
HCT VFR BLD AUTO: 34.4 %
HGB BLD-MCNC: 11.4 G/DL
LYMPHOCYTES # BLD AUTO: 2.8 K/UL
LYMPHOCYTES NFR BLD: 41.1 %
MAGNESIUM SERPL-MCNC: 1.6 MG/DL
MCH RBC QN AUTO: 29.1 PG
MCHC RBC AUTO-ENTMCNC: 33.1 %
MCV RBC AUTO: 88 FL
MONOCYTES # BLD AUTO: 0.4 K/UL
MONOCYTES NFR BLD: 5.5 %
NEUTROPHILS # BLD AUTO: 3.6 K/UL
NEUTROPHILS NFR BLD: 52.1 %
PHOSPHATE SERPL-MCNC: 2.8 MG/DL
PLATELET # BLD AUTO: 113 K/UL
PMV BLD AUTO: 11.8 FL
POCT GLUCOSE: 189 MG/DL (ref 70–110)
POCT GLUCOSE: 243 MG/DL (ref 70–110)
POCT GLUCOSE: 288 MG/DL (ref 70–110)
POCT GLUCOSE: 293 MG/DL (ref 70–110)
POTASSIUM SERPL-SCNC: 4.3 MMOL/L
PROT SERPL-MCNC: 6.2 G/DL
RBC # BLD AUTO: 3.92 M/UL
SODIUM SERPL-SCNC: 137 MMOL/L
WBC # BLD AUTO: 6.86 K/UL

## 2017-07-05 PROCEDURE — 25000003 PHARM REV CODE 250

## 2017-07-05 PROCEDURE — 36415 COLL VENOUS BLD VENIPUNCTURE: CPT

## 2017-07-05 PROCEDURE — 94761 N-INVAS EAR/PLS OXIMETRY MLT: CPT

## 2017-07-05 PROCEDURE — 80053 COMPREHEN METABOLIC PANEL: CPT

## 2017-07-05 PROCEDURE — 63600175 PHARM REV CODE 636 W HCPCS

## 2017-07-05 PROCEDURE — 25000003 PHARM REV CODE 250: Performed by: SURGERY

## 2017-07-05 PROCEDURE — 85025 COMPLETE CBC W/AUTO DIFF WBC: CPT

## 2017-07-05 PROCEDURE — 11000001 HC ACUTE MED/SURG PRIVATE ROOM

## 2017-07-05 PROCEDURE — 63600175 PHARM REV CODE 636 W HCPCS: Performed by: SURGERY

## 2017-07-05 PROCEDURE — 83735 ASSAY OF MAGNESIUM: CPT

## 2017-07-05 PROCEDURE — 84100 ASSAY OF PHOSPHORUS: CPT

## 2017-07-05 RX ORDER — INSULIN ASPART 100 [IU]/ML
1-10 INJECTION, SOLUTION INTRAVENOUS; SUBCUTANEOUS
Status: DISCONTINUED | OUTPATIENT
Start: 2017-07-05 | End: 2017-07-07 | Stop reason: HOSPADM

## 2017-07-05 RX ORDER — GLUCAGON 1 MG
1 KIT INJECTION
Status: DISCONTINUED | OUTPATIENT
Start: 2017-07-05 | End: 2017-07-07 | Stop reason: HOSPADM

## 2017-07-05 RX ORDER — IBUPROFEN 200 MG
16 TABLET ORAL
Status: DISCONTINUED | OUTPATIENT
Start: 2017-07-05 | End: 2017-07-07 | Stop reason: HOSPADM

## 2017-07-05 RX ORDER — IBUPROFEN 200 MG
24 TABLET ORAL
Status: DISCONTINUED | OUTPATIENT
Start: 2017-07-05 | End: 2017-07-07 | Stop reason: HOSPADM

## 2017-07-05 RX ADMIN — ASPIRIN 81 MG 81 MG: 81 TABLET ORAL at 08:07

## 2017-07-05 RX ADMIN — AMLODIPINE BESYLATE 10 MG: 5 TABLET ORAL at 08:07

## 2017-07-05 RX ADMIN — VANCOMYCIN HYDROCHLORIDE 1250 MG: 750 INJECTION, POWDER, LYOPHILIZED, FOR SOLUTION INTRAVENOUS at 08:07

## 2017-07-05 RX ADMIN — INSULIN DETEMIR 10 UNITS: 100 INJECTION, SOLUTION SUBCUTANEOUS at 03:07

## 2017-07-05 RX ADMIN — INSULIN ASPART 4 UNITS: 100 INJECTION, SOLUTION INTRAVENOUS; SUBCUTANEOUS at 12:07

## 2017-07-05 RX ADMIN — INSULIN ASPART 6 UNITS: 100 INJECTION, SOLUTION INTRAVENOUS; SUBCUTANEOUS at 04:07

## 2017-07-05 RX ADMIN — ENOXAPARIN SODIUM 40 MG: 100 INJECTION SUBCUTANEOUS at 08:07

## 2017-07-05 RX ADMIN — INSULIN ASPART 3 UNITS: 100 INJECTION, SOLUTION INTRAVENOUS; SUBCUTANEOUS at 08:07

## 2017-07-05 RX ADMIN — ATORVASTATIN CALCIUM 20 MG: 20 TABLET, FILM COATED ORAL at 08:07

## 2017-07-05 RX ADMIN — PIPERACILLIN SODIUM AND TAZOBACTAM SODIUM 4.5 G: 4; .5 INJECTION, POWDER, FOR SOLUTION INTRAVENOUS at 08:07

## 2017-07-05 NOTE — PROGRESS NOTES
Pharmacy New Medication Education     Patient accepted medication education.     Pharmacy educated patient on the following medications, using the teach-back method.   vancomycin    Learners of pharmacy medication education included:  patient     Patient +/- learner response:  verbalize understanding

## 2017-07-05 NOTE — PLAN OF CARE
Problem: Patient Care Overview  Goal: Plan of Care Review  Outcome: Ongoing (interventions implemented as appropriate)  Patient on RA with sats as documented.  Will continue to monitor.

## 2017-07-05 NOTE — PROGRESS NOTES
LISANDRO Jackson rec'd call from Edouard Brower -- [facility] - p #   [phone];   fax #  [phone] -     requests clinicals from UR nurse - Ms. Brower states pt was [facility] to hosp by [facility] (a referral was sent to her requesting pt care at [facility]).      In addition,, should pt need discharge services - [place] to contact .

## 2017-07-05 NOTE — PROGRESS NOTES
Progress Note    Admit Date: 7/3/2017   LOS: 2 days     SUBJECTIVE:     Patient seen and examined this AM.  Pain well controlled, tolerating PO.  Pain well controlled.    Scheduled Meds:   amlodipine  10 mg Oral Daily    aspirin  81 mg Oral Daily    atorvastatin  20 mg Oral Daily    enoxaparin  40 mg Subcutaneous Q24H    piperacillin-tazobactam 4.5 g in dextrose 5 % 100 mL IVPB (ready to mix system)  4.5 g Intravenous Q8H    sodium chloride 0.9%  3 mL Intravenous Q8H    vancomycin (VANCOCIN) IVPB  1,250 mg Intravenous Q24H     Continuous Infusions:   PRN Meds:acetaminophen, dextrose 50%, dextrose 50%, diphenhydrAMINE, glucagon (human recombinant), glucose, glucose, hydrocodone-acetaminophen 5-325mg, insulin aspart, ondansetron    Review of patient's allergies indicates:  No Known Allergies    Review of Systems  Negative for CP/SOB/LE swelling.  Negative for nausea/vomiting/diarrhea/constipation    OBJECTIVE:     Vital Signs (Most Recent)  Temp: 98.3 °F (36.8 °C) (07/05/17 0400)  Pulse: 80 (07/05/17 0400)  Resp: 16 (07/04/17 2300)  BP: (!) 102/58 (07/05/17 0400)  SpO2: 96 % (07/05/17 0347)    Vital Signs Range (Last 24H):  Temp:  [98.3 °F (36.8 °C)-100.5 °F (38.1 °C)]   Pulse:  [70-80]   Resp:  [16-18]   BP: (102-142)/(58-69)   SpO2:  [94 %-97 %]     I & O (Last 24H):  Intake/Output Summary (Last 24 hours) at 07/05/17 0717  Last data filed at 07/04/17 1627   Gross per 24 hour   Intake             1550 ml   Output             1200 ml   Net              350 ml     Physical Exam:  Gen: NAD  HEENT: NCAT  Cv: RRR  Lungs: CTABL  Abd: Soft NT ND  Left chest with cellulitis from axilla extending to anterior chest wall, improving incision to left mid axillary line with packing, dressing requiring change today    Laboratory:  CBC:   Recent Labs  Lab 07/04/17  0409   WBC 10.37   RBC 3.76*   HGB 11.1*   HCT 33.4*   *   MCV 89   MCH 29.5   MCHC 33.2     CMP:   Recent Labs  Lab 07/03/17  1422 07/04/17  0409   GLU  231* 251*   CALCIUM 9.1 9.2   ALBUMIN 4.0  --    PROT 6.7  --     136   K 4.3 4.5   CO2 24 23    105   BUN 19 20   CREATININE 1.70* 1.9*   ALKPHOS 99  --    ALT 47*  --    AST 34  --    BILITOT 1.6*  --      Blood culture: NGTD, wound culture pending  Awaiting AM labs.    ASSESSMENT/PLAN:   62 year old male with left chest wall abscess s/p I&D    -Blood culture: NGTD, wound culture pending  -f/u AM labs, patient with a temp of 100.5 yesterday  -abx: vanc, zosyn  -packing change today

## 2017-07-05 NOTE — PLAN OF CARE
Problem: Patient Care Overview  Goal: Plan of Care Review  Room air SpO2   97%. Pt with no apparent distress noted. Will continue to monitor.

## 2017-07-05 NOTE — PLAN OF CARE
Problem: Patient Care Overview  Goal: Plan of Care Review  Patient is AAOx4 and in NAD. Pt was febrile with 100.5 temp and was administered 650mg tylenol. Temp reduced to 99.1. Blood sugar of 235 treated with 2 units of insulin per sliding scale order. Dressing to left chest wall intact with drainage noted, re-enforced bandaging. Pt denies pain or nausea. Will continue to monitor.

## 2017-07-06 LAB
ALBUMIN SERPL BCP-MCNC: 3.2 G/DL
ALP SERPL-CCNC: 100 U/L
ALT SERPL W/O P-5'-P-CCNC: 20 U/L
ANION GAP SERPL CALC-SCNC: 11 MMOL/L
AST SERPL-CCNC: 15 U/L
BACTERIA SPEC AEROBE CULT: NORMAL
BASOPHILS # BLD AUTO: 0.01 K/UL
BASOPHILS NFR BLD: 0.1 %
BILIRUB SERPL-MCNC: 1.5 MG/DL
BUN SERPL-MCNC: 18 MG/DL
CALCIUM SERPL-MCNC: 9.1 MG/DL
CHLORIDE SERPL-SCNC: 105 MMOL/L
CO2 SERPL-SCNC: 23 MMOL/L
CREAT SERPL-MCNC: 1.6 MG/DL
DIFFERENTIAL METHOD: ABNORMAL
EOSINOPHIL # BLD AUTO: 0 K/UL
EOSINOPHIL NFR BLD: 0.6 %
ERYTHROCYTE [DISTWIDTH] IN BLOOD BY AUTOMATED COUNT: 13.9 %
EST. GFR  (AFRICAN AMERICAN): 53 ML/MIN/1.73 M^2
EST. GFR  (NON AFRICAN AMERICAN): 45 ML/MIN/1.73 M^2
GLUCOSE SERPL-MCNC: 152 MG/DL
HCT VFR BLD AUTO: 34.3 %
HGB BLD-MCNC: 11.3 G/DL
LYMPHOCYTES # BLD AUTO: 3 K/UL
LYMPHOCYTES NFR BLD: 41.9 %
MAGNESIUM SERPL-MCNC: 1.5 MG/DL
MCH RBC QN AUTO: 28.9 PG
MCHC RBC AUTO-ENTMCNC: 32.9 %
MCV RBC AUTO: 88 FL
MONOCYTES # BLD AUTO: 0.6 K/UL
MONOCYTES NFR BLD: 8.7 %
NEUTROPHILS # BLD AUTO: 3.4 K/UL
NEUTROPHILS NFR BLD: 48 %
PHOSPHATE SERPL-MCNC: 2.4 MG/DL
PLATELET # BLD AUTO: 134 K/UL
PMV BLD AUTO: 11.6 FL
POCT GLUCOSE: 151 MG/DL (ref 70–110)
POCT GLUCOSE: 217 MG/DL (ref 70–110)
POCT GLUCOSE: 285 MG/DL (ref 70–110)
POTASSIUM SERPL-SCNC: 4.1 MMOL/L
PROT SERPL-MCNC: 6.2 G/DL
RBC # BLD AUTO: 3.91 M/UL
SODIUM SERPL-SCNC: 139 MMOL/L
WBC # BLD AUTO: 7.12 K/UL

## 2017-07-06 PROCEDURE — 36415 COLL VENOUS BLD VENIPUNCTURE: CPT

## 2017-07-06 PROCEDURE — 94761 N-INVAS EAR/PLS OXIMETRY MLT: CPT

## 2017-07-06 PROCEDURE — 25000003 PHARM REV CODE 250: Performed by: SURGERY

## 2017-07-06 PROCEDURE — 97605 NEG PRS WND THER DME<=50SQCM: CPT

## 2017-07-06 PROCEDURE — 84100 ASSAY OF PHOSPHORUS: CPT

## 2017-07-06 PROCEDURE — 85025 COMPLETE CBC W/AUTO DIFF WBC: CPT

## 2017-07-06 PROCEDURE — 25000003 PHARM REV CODE 250: Performed by: FAMILY MEDICINE

## 2017-07-06 PROCEDURE — 63600175 PHARM REV CODE 636 W HCPCS

## 2017-07-06 PROCEDURE — 25000003 PHARM REV CODE 250

## 2017-07-06 PROCEDURE — 11000001 HC ACUTE MED/SURG PRIVATE ROOM

## 2017-07-06 PROCEDURE — 63600175 PHARM REV CODE 636 W HCPCS: Performed by: SURGERY

## 2017-07-06 PROCEDURE — 80053 COMPREHEN METABOLIC PANEL: CPT

## 2017-07-06 PROCEDURE — 83735 ASSAY OF MAGNESIUM: CPT

## 2017-07-06 RX ORDER — LANOLIN ALCOHOL/MO/W.PET/CERES
400 CREAM (GRAM) TOPICAL ONCE
Status: COMPLETED | OUTPATIENT
Start: 2017-07-06 | End: 2017-07-06

## 2017-07-06 RX ORDER — SODIUM,POTASSIUM PHOSPHATES 280-250MG
2 POWDER IN PACKET (EA) ORAL ONCE
Status: COMPLETED | OUTPATIENT
Start: 2017-07-06 | End: 2017-07-06

## 2017-07-06 RX ADMIN — SODIUM CHLORIDE, PRESERVATIVE FREE 3 ML: 5 INJECTION INTRAVENOUS at 10:07

## 2017-07-06 RX ADMIN — INSULIN ASPART 6 UNITS: 100 INJECTION, SOLUTION INTRAVENOUS; SUBCUTANEOUS at 04:07

## 2017-07-06 RX ADMIN — VANCOMYCIN HYDROCHLORIDE 1000 MG: 1 INJECTION, POWDER, LYOPHILIZED, FOR SOLUTION INTRAVENOUS at 04:07

## 2017-07-06 RX ADMIN — MAGNESIUM OXIDE TAB 400 MG (241.3 MG ELEMENTAL MG) 400 MG: 400 (241.3 MG) TAB at 10:07

## 2017-07-06 RX ADMIN — ENOXAPARIN SODIUM 40 MG: 100 INJECTION SUBCUTANEOUS at 08:07

## 2017-07-06 RX ADMIN — SODIUM CHLORIDE, PRESERVATIVE FREE 3 ML: 5 INJECTION INTRAVENOUS at 03:07

## 2017-07-06 RX ADMIN — POTASSIUM & SODIUM PHOSPHATES POWDER PACK 280-160-250 MG 2 PACKET: 280-160-250 PACK at 10:07

## 2017-07-06 RX ADMIN — INSULIN DETEMIR 10 UNITS: 100 INJECTION, SOLUTION SUBCUTANEOUS at 10:07

## 2017-07-06 RX ADMIN — ATORVASTATIN CALCIUM 20 MG: 20 TABLET, FILM COATED ORAL at 10:07

## 2017-07-06 RX ADMIN — AMLODIPINE BESYLATE 10 MG: 5 TABLET ORAL at 10:07

## 2017-07-06 RX ADMIN — ASPIRIN 81 MG 81 MG: 81 TABLET ORAL at 10:07

## 2017-07-06 RX ADMIN — INSULIN ASPART 2 UNITS: 100 INJECTION, SOLUTION INTRAVENOUS; SUBCUTANEOUS at 09:07

## 2017-07-06 NOTE — PROGRESS NOTES
Vancomycin Pharmacokinetics Monitoring Protocol  61 y/o male   Ht 73 in, Wt 101.6 kg, adjBW 88.6 kg,  SCr 1.6 mg/dl, eCrCl ~ 60ml/min  Indication: L chest abscess  Target trough : ~ 15 mcg/ml  Current antibiotics: Vancomycin 1.25mg IV q24h  Based on his pharmacokinetics/protocol, will adjust vancomycin to 1000mg IV q12h with trough drawn prior to the 3rd dose (7/7 @ 1400). Pharmacy will continue to follow.

## 2017-07-06 NOTE — PLAN OF CARE
Problem: Patient Care Overview  Goal: Plan of Care Review  Pt AA&O x 4. No complaints of pain. Respirations even and unlabored. Glucose monitored. Dressing to left side of chest changed. No evidence of distress noted. Safety maintained. Bed in lowest position, side rails up x 3, call light and personal items within reach. Pt notified to call for assistance if needed. Pt verbalizes understanding. Will continue to monitor.

## 2017-07-06 NOTE — PROGRESS NOTES
Progress Note    Admit Date: 7/3/2017   LOS: 3 days     SUBJECTIVE:     NAEO. Vikas diet, amb, afebrile. Pain controlled    Scheduled Meds:   amlodipine  10 mg Oral Daily    aspirin  81 mg Oral Daily    atorvastatin  20 mg Oral Daily    enoxaparin  40 mg Subcutaneous Q24H    insulin detemir  10 Units Subcutaneous Daily    sodium chloride 0.9%  3 mL Intravenous Q8H    vancomycin (VANCOCIN) IVPB  1,250 mg Intravenous Q24H     Continuous Infusions:   PRN Meds:acetaminophen, dextrose 50%, diphenhydrAMINE, glucagon (human recombinant), glucose, glucose, hydrocodone-acetaminophen 5-325mg, insulin aspart, ondansetron    Review of patient's allergies indicates:  No Known Allergies    OBJECTIVE:     Vital Signs (Most Recent)  Temp: 99.4 °F (37.4 °C) (07/06/17 0035)  Pulse: 84 (07/06/17 0035)  Resp: 17 (07/06/17 0035)  BP: 129/69 (07/06/17 0035)  SpO2: 98 % (07/06/17 0353)    Vital Signs Range (Last 24H):  Temp:  [99 °F (37.2 °C)-100.1 °F (37.8 °C)]   Pulse:  [78-86]   Resp:  [17-19]   BP: (104-129)/(55-69)   SpO2:  [96 %-98 %]     I & O (Last 24H):  I/O last 3 completed shifts:  In: 2250 [P.O.:1800; IV Piggyback:450]  Out: 3650 [Urine:3650]  I/O this shift:  In: 250 [I.V.:250]  Out: 500 [Urine:500]    Physical Exam:  Gen: NAD  HEENT: NCAT  Cv: RRR  Lungs: CTABL  Abd: Soft NT ND  Left chest with cellulitis from axilla extending to anterior chest wall, improving, packing in place      ASSESSMENT/PLAN:     62M L chest abscess  Cont vanc, narrow with Cx results  F/u Cx  Change packing today  Needs dressing changes BID    Tony Harris MD

## 2017-07-06 NOTE — CONSULTS
Consulted for I&D wound to left chest wall with copious drainage to assess for wound vac application. No reported pain during wound care. Spoke with , Melvina, in regards to ordering home wound vac. Pt's insurance supports [employer] home vac. Patient will require [employer] to be applied prior to d/c or be applied per HH after d/c since not compatible with applied [employer] vac dressing.       Left Chest I&D day # 3: 4 x 0.7 x 3.1 (cm) with tunnel at 12 o'clock of 2.7 (cm); 3 o'clock of 3.5 (cm); 9 o'clock of 3.8 (cm). Large amount of creamy sanguinous drainage without odor. Wound actively draining during my assessment. Irrigated and expressed exudate from wound. Applied 3M Cavilon periwound. Window draped periwound skin with transparent dressing prior to placing black sponge x 2 pieces. Suction intact at 125 mmHg low continuous.

## 2017-07-06 NOTE — PLAN OF CARE
Problem: Patient Care Overview  Goal: Plan of Care Review  Sats 97% RA, will continue to monitor.

## 2017-07-06 NOTE — PROGRESS NOTES
Pharmacy New Medication Education     Patient accepted medication education.     Pharmacy educated patient on the following medications, using the teach-back method.   Detemir  Magnesium  Phos sod      Learners of pharmacy medication education included:  patient     Patient +/- learner response:  verbalize understanding

## 2017-07-07 VITALS
TEMPERATURE: 99 F | SYSTOLIC BLOOD PRESSURE: 111 MMHG | BODY MASS INDEX: 29.69 KG/M2 | WEIGHT: 224 LBS | HEIGHT: 73 IN | HEART RATE: 84 BPM | DIASTOLIC BLOOD PRESSURE: 60 MMHG | OXYGEN SATURATION: 97 % | RESPIRATION RATE: 18 BRPM

## 2017-07-07 LAB
ALBUMIN SERPL BCP-MCNC: 3.3 G/DL
ALP SERPL-CCNC: 105 U/L
ALT SERPL W/O P-5'-P-CCNC: 19 U/L
ANION GAP SERPL CALC-SCNC: 11 MMOL/L
ANISOCYTOSIS BLD QL SMEAR: SLIGHT
AST SERPL-CCNC: 19 U/L
BASOPHILS # BLD AUTO: 0.05 K/UL
BASOPHILS NFR BLD: 0.5 %
BILIRUB SERPL-MCNC: 1.4 MG/DL
BUN SERPL-MCNC: 20 MG/DL
CALCIUM SERPL-MCNC: 9.1 MG/DL
CHLORIDE SERPL-SCNC: 103 MMOL/L
CO2 SERPL-SCNC: 24 MMOL/L
CREAT SERPL-MCNC: 1.6 MG/DL
DIFFERENTIAL METHOD: ABNORMAL
EOSINOPHIL # BLD AUTO: 0.1 K/UL
EOSINOPHIL NFR BLD: 1.3 %
ERYTHROCYTE [DISTWIDTH] IN BLOOD BY AUTOMATED COUNT: 13.9 %
EST. GFR  (AFRICAN AMERICAN): 53 ML/MIN/1.73 M^2
EST. GFR  (NON AFRICAN AMERICAN): 45 ML/MIN/1.73 M^2
GLUCOSE SERPL-MCNC: 180 MG/DL
HCT VFR BLD AUTO: 36.2 %
HGB BLD-MCNC: 11.9 G/DL
LYMPHOCYTES # BLD AUTO: 4.4 K/UL
LYMPHOCYTES NFR BLD: 41.7 %
MAGNESIUM SERPL-MCNC: 1.6 MG/DL
MCH RBC QN AUTO: 29 PG
MCHC RBC AUTO-ENTMCNC: 32.9 %
MCV RBC AUTO: 88 FL
MONOCYTES # BLD AUTO: 0.9 K/UL
MONOCYTES NFR BLD: 8.9 %
NEUTROPHILS # BLD AUTO: 4.8 K/UL
NEUTROPHILS NFR BLD: 45.5 %
PHOSPHATE SERPL-MCNC: 2.6 MG/DL
PLATELET # BLD AUTO: 167 K/UL
PLATELET BLD QL SMEAR: ABNORMAL
PMV BLD AUTO: 11.9 FL
POCT GLUCOSE: 177 MG/DL (ref 70–110)
POCT GLUCOSE: 192 MG/DL (ref 70–110)
POCT GLUCOSE: 224 MG/DL (ref 70–110)
POTASSIUM SERPL-SCNC: 4 MMOL/L
PROT SERPL-MCNC: 6.2 G/DL
RBC # BLD AUTO: 4.11 M/UL
SODIUM SERPL-SCNC: 138 MMOL/L
WBC # BLD AUTO: 10.45 K/UL

## 2017-07-07 PROCEDURE — 25000003 PHARM REV CODE 250: Performed by: SURGERY

## 2017-07-07 PROCEDURE — 84100 ASSAY OF PHOSPHORUS: CPT

## 2017-07-07 PROCEDURE — 63600175 PHARM REV CODE 636 W HCPCS: Performed by: SURGERY

## 2017-07-07 PROCEDURE — 94761 N-INVAS EAR/PLS OXIMETRY MLT: CPT

## 2017-07-07 PROCEDURE — 25000003 PHARM REV CODE 250: Performed by: FAMILY MEDICINE

## 2017-07-07 PROCEDURE — 85007 BL SMEAR W/DIFF WBC COUNT: CPT

## 2017-07-07 PROCEDURE — 36415 COLL VENOUS BLD VENIPUNCTURE: CPT

## 2017-07-07 PROCEDURE — 83735 ASSAY OF MAGNESIUM: CPT

## 2017-07-07 PROCEDURE — 97607 NEG PRS WND THR NDME<=50SQCM: CPT

## 2017-07-07 PROCEDURE — 80053 COMPREHEN METABOLIC PANEL: CPT

## 2017-07-07 PROCEDURE — 25000003 PHARM REV CODE 250

## 2017-07-07 PROCEDURE — 85027 COMPLETE CBC AUTOMATED: CPT

## 2017-07-07 RX ORDER — CLINDAMYCIN HYDROCHLORIDE 150 MG/1
450 CAPSULE ORAL EVERY 6 HOURS
Qty: 72 CAPSULE | Refills: 0 | Status: SHIPPED | OUTPATIENT
Start: 2017-07-07 | End: 2017-07-13

## 2017-07-07 RX ORDER — SODIUM,POTASSIUM PHOSPHATES 280-250MG
2 POWDER IN PACKET (EA) ORAL ONCE
Status: COMPLETED | OUTPATIENT
Start: 2017-07-07 | End: 2017-07-07

## 2017-07-07 RX ORDER — CLINDAMYCIN HYDROCHLORIDE 150 MG/1
450 CAPSULE ORAL EVERY 6 HOURS
Status: DISCONTINUED | OUTPATIENT
Start: 2017-07-07 | End: 2017-07-07 | Stop reason: HOSPADM

## 2017-07-07 RX ADMIN — CLINDAMYCIN HYDROCHLORIDE 450 MG: 150 CAPSULE ORAL at 11:07

## 2017-07-07 RX ADMIN — INSULIN ASPART 2 UNITS: 100 INJECTION, SOLUTION INTRAVENOUS; SUBCUTANEOUS at 05:07

## 2017-07-07 RX ADMIN — ATORVASTATIN CALCIUM 20 MG: 20 TABLET, FILM COATED ORAL at 08:07

## 2017-07-07 RX ADMIN — ASPIRIN 81 MG 81 MG: 81 TABLET ORAL at 08:07

## 2017-07-07 RX ADMIN — AMLODIPINE BESYLATE 10 MG: 5 TABLET ORAL at 08:07

## 2017-07-07 RX ADMIN — SODIUM CHLORIDE, PRESERVATIVE FREE 3 ML: 5 INJECTION INTRAVENOUS at 05:07

## 2017-07-07 RX ADMIN — INSULIN ASPART 2 UNITS: 100 INJECTION, SOLUTION INTRAVENOUS; SUBCUTANEOUS at 11:07

## 2017-07-07 RX ADMIN — VANCOMYCIN HYDROCHLORIDE 1000 MG: 1 INJECTION, POWDER, LYOPHILIZED, FOR SOLUTION INTRAVENOUS at 04:07

## 2017-07-07 RX ADMIN — INSULIN DETEMIR 10 UNITS: 100 INJECTION, SOLUTION SUBCUTANEOUS at 08:07

## 2017-07-07 RX ADMIN — POTASSIUM & SODIUM PHOSPHATES POWDER PACK 280-160-250 MG 2 PACKET: 280-160-250 PACK at 11:07

## 2017-07-07 NOTE — PROGRESS NOTES
Progress Note    Admit Date: 7/3/2017   LOS: 4 days     SUBJECTIVE:     NAEO. Wound vac placed to L chest wound. Amb well. Vikas diet. Pain controlled. Afebrile.    Scheduled Meds:   amlodipine  10 mg Oral Daily    aspirin  81 mg Oral Daily    atorvastatin  20 mg Oral Daily    enoxaparin  40 mg Subcutaneous Q24H    insulin detemir  10 Units Subcutaneous Daily    sodium chloride 0.9%  3 mL Intravenous Q8H    vancomycin (VANCOCIN) IVPB  1,000 mg Intravenous Q12H     Continuous Infusions:   PRN Meds:acetaminophen, dextrose 50%, diphenhydrAMINE, glucagon (human recombinant), glucose, glucose, hydrocodone-acetaminophen 5-325mg, insulin aspart, ondansetron    Review of patient's allergies indicates:  No Known Allergies    OBJECTIVE:     Vital Signs (Most Recent)  Temp: 99.3 °F (37.4 °C) (07/07/17 0433)  Pulse: 80 (07/07/17 0433)  Resp: 18 (07/07/17 0433)  BP: (!) 114/58 (07/07/17 0433)  SpO2: 98 % (07/07/17 0514)    Vital Signs Range (Last 24H):  Temp:  [97.7 °F (36.5 °C)-99.3 °F (37.4 °C)]   Pulse:  [78-85]   Resp:  [18]   BP: (103-117)/(55-61)   SpO2:  [96 %-98 %]     I & O (Last 24H):  I/O last 3 completed shifts:  In: 2010 [P.O.:1260; I.V.:250; IV Piggyback:500]  Out: 2900 [Urine:2900]  No intake/output data recorded.    Physical Exam:  Gen: NAD  HEENT: NCAT  Cv: RRR  Lungs: CTABL  Abd: Soft NT ND  Left chest with cellulitis improving, wound vac in place, no palpable abscess. Minimal serosang output    Laboratory:  WBC 10.45  H/H 11.9/36.2    CMP Pending    ASSESSMENT/PLAN:     63yo M L chest abscess    Plan:  -Switch to Clinda per sens  -Cont wound vac  -Home wound care/wound vac    Tony Harris MD

## 2017-07-07 NOTE — DISCHARGE SUMMARY
[facility]  Discharge Summary      Admit Date: 7/3/2017    Discharge Date and Time:  07/07/2017 4:52 PM    Attending Physician: RADHA Burroughs MD     Reason for Admission: Tony Harris MD    Procedures Performed: Bedside Incision and Drainage on 7/3/17    Hospital Course Admitted for L chest abscess and cellulitis. Bedside I&D performed with purulent drainage and packing. Started on IV Abx. Improved everyday with local wound care. Eventually transitioned to PO Abx and Wound vac. Discharged home with wound vac and PO Abx.    Consults: none    Significant Diagnostic Studies: none    Final Diagnoses:    Principal Problem: Abscess of chest wall   Secondary Diagnoses:   Active Hospital Problems    Diagnosis  POA    *Abscess of chest wall [L02.213]  Yes    CLL (chronic lymphocytic leukemia) [C91.10]  Yes    Immunosuppressed status [D89.9]  Yes    Essential hypertension [I10]  Yes    Hyperlipidemia [E78.5]  Yes    Abscess [L02.91]  Yes    Type 2 diabetes mellitus with stage 3 chronic kidney disease, with long-term current use of insulin [E11.22, N18.3, Z79.4]  Yes    Cellulitis of chest wall [L03.313]  Yes      Resolved Hospital Problems    Diagnosis Date Resolved POA   No resolved problems to display.       Discharged Condition: stable    Disposition: Home or Self Care    Follow Up/Patient Instructions:     Medications:  Reconciled Home Medications:   Current Discharge Medication List      START taking these medications    Details   clindamycin (CLEOCIN) 150 MG capsule Take 3 capsules (450 mg total) by mouth every 6 (six) hours.  Qty: 72 capsule, Refills: 0         CONTINUE these medications which have NOT CHANGED    Details   amlodipine (NORVASC) 10 MG tablet Take 10 mg by mouth once daily.      aspirin 81 MG Chew Take 81 mg by mouth once daily.      atorvastatin (LIPITOR) 40 MG tablet Take 20 mg by mouth once daily.      dorzolamide-timolol 2-0.5% (COSOPT) 22.3-6.8 mg/mL ophthalmic  solution 1 drop 2 (two) times daily.      ibrutinib 140 mg Cap Take 420 mg by mouth once daily.       insulin NPH (NOVOLIN N) 100 unit/mL injection Inject into the skin 2 (two) times daily before meals.      multivitamin-zinc gluconate 5 mg/mL Drop Take by mouth.      potassium chloride (KLOR-CON) 10 MEQ TbSR Take 10 mEq by mouth once daily.      dorzolamide (TRUSOPT) 2 % ophthalmic solution 1 drop 2 (two) times daily.             Discharge Procedure Orders  Ambulatory referral to Home Health   Referral Priority: Routine Referral Type: Home Health   Referral Reason: Specialty Services Required    Requested Specialty: Home Health Services    Number of Visits Requested: 1      Diet Diabetic 1800 Calories     Activity as tolerated     Lifting restrictions     Call MD for:  temperature >100.4     Call MD for:  persistent nausea and vomiting or diarrhea     Call MD for:  severe uncontrolled pain     Call MD for:  redness, tenderness, or signs of infection (pain, swelling, redness, odor or green/yellow discharge around incision site)     Call MD for:  difficulty breathing or increased cough     Call MD for:  severe persistent headache     Call MD for:  worsening rash     Call MD for:  persistent dizziness, light-headedness, or visual disturbances     Call MD for:  increased confusion or weakness     Change dressing (specify)   Order Comments: Wound vac to be changed per home health nursing every 3 days.       Follow-up Information     Hansa Marques MD. Schedule an appointment as soon as possible for a visit in 3 weeks.    Specialty:  General Surgery  Contact information:  [address]  [address]  [place] [address] [phone]

## 2017-07-07 NOTE — PLAN OF CARE
Problem: Patient Care Overview  Goal: Plan of Care Review  Outcome: Ongoing (interventions implemented as appropriate)  Pt AAOx4, no family members at bedside. Initial assessment documented per flowsheet. Pt denies pain, n/v/d, and SOB. Wound vac in place to suction to left chest wall wound. + BM on 7/6. IV antibiotics infusing per MAR. Blood glucose checks continued. Safety maintained - will cont to monitor.

## 2017-07-07 NOTE — PLAN OF CARE
TN met with pt, wife and other family member   [facility] wound vac arrived   Wound Care nurse to place wound vac     HH arranged with [facility] - confirmed with intake nurse Teagan - pt to be seen Sat 7/8/17      Future Appointments  Date Time Provider Department Center   7/20/2017 12:30 PM Hansa Marques MD [facility]     pt's pcp's nurse with [facility] - Dr. Alberts - to call pt with f/u apt.      TN [facility] -  - she wants a copy of pt's HH orders and d/c summary faxed to her on Monday 7/10   p #  ;   f #  .            07/07/17 1708   Final Note   Assessment Type Final Discharge Note   Discharge Disposition Home-Health   Discharge planning education complete? Yes   What phone number can be called within the next 1-3 days to see how you are doing after discharge? [phone]   Hospital Follow Up  Appt(s) scheduled? Yes   Discharge plans and expectations educations in teach back method with documentation complete? Yes   Offered [facility] -- Bedside Delivery? Yes   Discharge/Hospital Encounter Summary to ([facility]) PCP n/a   Referral to Outpatient Case Management complete? n/a   Referral to / orders for Home Health Complete? Yes   30 day supply of medicines given at discharge, if documented non-compliance / non-adherence? n/a   Any social issues identified prior to discharge? No   Did you assess the readiness or willingness of the family or caregiver to support self management of care? Yes   Right Care Referral Info   Post Acute Recommendation Home-care   Referral Type (home health )   Facility Name [facility]

## 2017-07-07 NOTE — PROGRESS NOTES
Pharmacy New Medication Education     Patient accepted medication education.     Pharmacy educated patient on the following medications, using the teach-back method.       clindamycin  Learners of pharmacy medication education included:  patient     Patient +/- learner response:  verbalize understanding

## 2017-07-07 NOTE — PLAN OF CARE
Problem: Patient Care Overview  Goal: Plan of Care Review  Room air SpO2   96%. Pt with no apparent distess noted. Will continue to monitor.

## 2017-07-08 LAB — BACTERIA BLD CULT: NORMAL

## 2017-07-08 NOTE — PROGRESS NOTES
[employer] wound vac applied to left chest wall prior to discharge. Family at bedside. Wound vac supplies for home at bedside.

## 2017-07-10 ENCOUNTER — PATIENT OUTREACH (OUTPATIENT)
Dept: ADMINISTRATIVE | Facility: CLINIC | Age: 62
End: 2017-07-10

## 2017-07-10 NOTE — PATIENT INSTRUCTIONS
Abscess (Incision & Drainage)  An abscess (sometimes called a boil) occurs when bacteria get trapped under the skin and start to grow. Pus forms inside the abscess as the body responds to the bacteria. An abscess can happen with an insect bite, ingrown hair, blocked oil gland, pimple, cyst, or puncture wound.  Your healthcare provider has drained the pus from your abscess. If the abscess pocket was large, your healthcare provider may have inserted gauze packing. Your provider will need to remove and possibly replace it on your next visit. You may not need antibiotics to treat a simple abscess, unless the infection is spreading into the skin around the wound (cellulitis).  Healing of the wound will take about 1 to 2 weeks, depending on the size of the abscess. Healthy tissue will grow from the bottom and sides of the opening until it seals over.  Home care  These tips can help your wound heal:  · The wound may drain for the first 2 days. Cover the wound with a clean dry dressing. If the dressing becomes soaked with blood or pus, change it.  · If a gauze packing was placed inside the abscess cavity, you may be told to remove it yourself. You may do this in the shower. Once the packing is removed, you should wash the area in the shower or bath 3 to 4 times a day, until the skin opening has closed. Make sure you wash your hands after changing the packing or cleaning the wound.  · If you were prescribed antibiotics, take them as directed until they are all gone.  · You may use acetaminophen or ibuprofen to control pain, unless another pain medicine was prescribed. If you have liver disease or ever had a stomach ulcer, talk with your doctor before using these medicines.  Follow-up care  Follow up with your healthcare provider, or as advised. If a gauze packing was inserted in your wound, it should be removed in 1 to 2 days. Check your wound every day for the signs of worsening infection listed below.  When to seek  medical advice  Call your healthcare provider right away if any of these occur:  · Increasing redness or swelling  · Red streaks in the skin leading away from the wound  · Increasing local pain or swelling  · Continued pus draining from the wound 2 days after treatment  · Fever of 100.4ºF (38ºC) or higher, or as directed by your healthcare provider  · Boil returns when you are at home  Date Last Reviewed: 9/1/2017© 0928-2858 [employer]. [address] [place] [address]. All rights reserved. This information is not intended as a substitute for professional medical care. Always follow your healthcare professional's instructions.

## 2017-07-20 ENCOUNTER — OFFICE VISIT (OUTPATIENT)
Dept: NEUROLOGY | Facility: HOSPITAL | Age: 62
End: 2017-07-20
Attending: SURGERY
Payer: MEDICARE

## 2017-07-20 VITALS
TEMPERATURE: 99 F | DIASTOLIC BLOOD PRESSURE: 79 MMHG | SYSTOLIC BLOOD PRESSURE: 124 MMHG | BODY MASS INDEX: 29.29 KG/M2 | HEIGHT: 73 IN | WEIGHT: 221 LBS | HEART RATE: 68 BPM

## 2017-07-20 DIAGNOSIS — Z09 POSTOP CHECK: Primary | ICD-10-CM

## 2017-07-20 PROCEDURE — 99214 OFFICE O/P EST MOD 30 MIN: CPT | Performed by: SURGERY

## 2017-07-20 NOTE — PROGRESS NOTES
S/p I and D lt chest wall    Doing well no fever eating well  Wound vac on    Sponge removed  Wound looks clean, closing minimal output    Dressing done  No more need for wound vac    Dressing daily to prevent soiling of shirt  Keep wound clean and dry    F/u dr. Burroughs one month

## 2017-07-25 ENCOUNTER — TELEPHONE (OUTPATIENT)
Dept: NEUROLOGY | Facility: HOSPITAL | Age: 62
End: 2017-07-25

## 2017-07-25 NOTE — TELEPHONE ENCOUNTER
----- Message from Melissa Hester sent at 7/25/2017  2:51 PM CDT -----  Contact: Swapna Contreras- Swapna with [facility] called in regards to a wound therapy prescription that was faxed last week. Swapna would like to know the status. Swapna can be reached at [phone] ext [phone].

## 2017-07-31 ENCOUNTER — TELEPHONE (OUTPATIENT)
Dept: NEUROLOGY | Facility: HOSPITAL | Age: 62
End: 2017-07-31

## 2017-08-21 ENCOUNTER — OFFICE VISIT (OUTPATIENT)
Dept: NEUROLOGY | Facility: HOSPITAL | Age: 62
End: 2017-08-21
Attending: SURGERY
Payer: MEDICARE

## 2017-08-21 VITALS
DIASTOLIC BLOOD PRESSURE: 77 MMHG | TEMPERATURE: 98 F | HEIGHT: 73 IN | SYSTOLIC BLOOD PRESSURE: 134 MMHG | BODY MASS INDEX: 29.45 KG/M2 | WEIGHT: 222.19 LBS | HEART RATE: 79 BPM

## 2017-08-21 DIAGNOSIS — L02.213 ABSCESS OF CHEST WALL: Primary | ICD-10-CM

## 2017-08-21 PROCEDURE — 99213 OFFICE O/P EST LOW 20 MIN: CPT | Performed by: SURGERY

## 2017-08-21 NOTE — PROGRESS NOTES
"NOLANETS:  [facility]  A collaboration between [facility] and [facility]      PATIENT: Kevin Mancilla  MRN: [id number]  DATE: 8/21/2017    Subjective:      Chief Complaint: Follow-up  doing well  Wound healed well    Vitals:   Vitals:    08/21/17 1408   BP: 134/77   Pulse: 79   Temp: 97.9 °F (36.6 °C)   TempSrc: Oral   Weight: 100.8 kg (222 lb 3.2 oz)   Height: 6' 1" (1.854 m)        Karnofsky Score:     Diagnosis: No diagnosis found.     Oncologic History:     Interval History:     Past Medical History:  Past Medical History:   Diagnosis Date    CLL (chronic lymphocytic leukemia)     Diabetes mellitus     Glaucoma     High cholesterol     Hypertension        Past Surgical History:  No past surgical history on file.    Family History:  No family history on file.    Allergies:  Review of patient's allergies indicates:  No Known Allergies    Medications:  Current Outpatient Prescriptions   Medication Sig Dispense Refill    amlodipine (NORVASC) 10 MG tablet Take 10 mg by mouth once daily.      aspirin 81 MG Chew Take 81 mg by mouth once daily.      atorvastatin (LIPITOR) 20 MG tablet Take 20 mg by mouth once daily.      dorzolamide-timolol 2-0.5% (COSOPT) 22.3-6.8 mg/mL ophthalmic solution 1 drop 2 (two) times daily.      ibrutinib 140 mg Cap Take 420 mg by mouth once daily.       insulin NPH (NOVOLIN N) 100 unit/mL injection Inject into the skin 2 (two) times daily before meals.      multivitamin-zinc gluconate 5 mg/mL Drop Take by mouth.      potassium chloride (KLOR-CON) 10 MEQ TbSR Take 10 mEq by mouth once daily.       No current facility-administered medications for this visit.        Review of Systems   Objective:      Physical Exam   Constitutional: He appears well-developed and well-nourished. No distress.   HENT:   Head: Atraumatic.   Eyes: EOM are normal.   Neck: Neck supple.   Cardiovascular: Normal " rate and regular rhythm.    Pulmonary/Chest: Effort normal and breath sounds normal. No stridor.   chest wall wound completely healed   Abdominal: Soft. Bowel sounds are normal. He exhibits no distension and no mass. There is no tenderness. There is no rebound and no guarding.   Musculoskeletal: Normal range of motion.   Lymphadenopathy:     He has no cervical adenopathy.   Skin: Skin is warm. He is not diaphoretic.      Assessment:       No diagnosis found.    Laboratory Data:       Impression:   Plan:       S/p I and d lt chest wall area    Wound looks good  Completely healed    Recommnd: f/u with me PRN and f/u with PCP                ANEESH Santo MD, FACS   Associate Professor of Surgery, [facility]   Neuroendocrine Surgery, Hepatic/Pancreatic & General Surgery   [address]   [place] [address]   ph. [phone]; [phone]   fax. [phone]

## 2017-09-22 ENCOUNTER — HOSPITAL ENCOUNTER (EMERGENCY)
Facility: HOSPITAL | Age: 62
Discharge: HOME | End: 2017-09-22
Attending: EMERGENCY MEDICINE
Payer: MEDICARE

## 2017-09-22 VITALS
RESPIRATION RATE: 16 BRPM | HEIGHT: 73 IN | WEIGHT: 224 LBS | TEMPERATURE: 99 F | HEART RATE: 86 BPM | OXYGEN SATURATION: 97 % | SYSTOLIC BLOOD PRESSURE: 130 MMHG | BODY MASS INDEX: 29.69 KG/M2 | DIASTOLIC BLOOD PRESSURE: 66 MMHG

## 2017-09-22 DIAGNOSIS — J01.10 ACUTE NON-RECURRENT FRONTAL SINUSITIS: Primary | ICD-10-CM

## 2017-09-22 PROCEDURE — 99283 EMERGENCY DEPT VISIT LOW MDM: CPT | Mod: 25

## 2017-09-22 PROCEDURE — 63600175 PHARM REV CODE 636 W HCPCS: Performed by: EMERGENCY MEDICINE

## 2017-09-22 PROCEDURE — 96372 THER/PROPH/DIAG INJ SC/IM: CPT

## 2017-09-22 RX ORDER — AMOXICILLIN AND CLAVULANATE POTASSIUM 875; 125 MG/1; MG/1
1 TABLET, FILM COATED ORAL 2 TIMES DAILY
Qty: 14 TABLET | Refills: 0 | Status: SHIPPED | OUTPATIENT
Start: 2017-09-22 | End: 2018-01-03

## 2017-09-22 RX ORDER — BETAMETHASONE SODIUM PHOSPHATE AND BETAMETHASONE ACETATE 3; 3 MG/ML; MG/ML
12 INJECTION, SUSPENSION INTRA-ARTICULAR; INTRALESIONAL; INTRAMUSCULAR; SOFT TISSUE
Status: COMPLETED | OUTPATIENT
Start: 2017-09-22 | End: 2017-09-22

## 2017-09-22 RX ORDER — AMOXICILLIN AND CLAVULANATE POTASSIUM 875; 125 MG/1; MG/1
1 TABLET, FILM COATED ORAL 2 TIMES DAILY
Qty: 14 TABLET | Refills: 0 | Status: SHIPPED | OUTPATIENT
Start: 2017-09-22 | End: 2017-09-22

## 2017-09-22 RX ADMIN — BETAMETHASONE SODIUM PHOSPHATE AND BETAMETHASONE ACETATE 12 MG: 3; 3 INJECTION, SUSPENSION INTRA-ARTICULAR; INTRALESIONAL; INTRAMUSCULAR at 09:09

## 2017-09-22 NOTE — ED PROVIDER NOTES
"Encounter Date: 9/22/2017       History     Chief Complaint   Patient presents with    Nasal Congestion     Pt reports "sinus problem" c/o congestion, pressure behind his eyes and cough. States it started 2 weeks ago. Pt was taking OTC meds but they stopped working     Well-developed 60-year-old male presents emergent complaints of upper nasal sinus congestion associated with a low-grade fever.  Suspects he may have a sinusitis.  Has had similar episodes in the past.  Patient has diabetes, hypertension, high cholesterol.  Typically healthy otherwise.  Does not smoke.  Does not drink.  Reports pain mostly in the frontal maxillary sinus region however there is some pain in the upper region of his glabellar sinuses.          Review of patient's allergies indicates:  No Known Allergies  Past Medical History:   Diagnosis Date    CLL (chronic lymphocytic leukemia)     Diabetes mellitus     Glaucoma     High cholesterol     Hypertension      History reviewed. No pertinent surgical history.  History reviewed. No pertinent family history.  Social History   Substance Use Topics    Smoking status: Never Smoker    Smokeless tobacco: Never Used    Alcohol use No     Review of Systems   Constitutional: Negative.    HENT: Positive for congestion, sinus pain and sinus pressure.    Eyes: Negative.    Respiratory: Negative.    Cardiovascular: Negative.    Gastrointestinal: Negative.    Musculoskeletal: Negative.    All other systems reviewed and are negative.      Physical Exam     Initial Vitals [09/22/17 0939]   BP Pulse Resp Temp SpO2   130/66 86 16 99.3 °F (37.4 °C) 97 %      MAP       87.33         Physical Exam    Nursing note and vitals reviewed.  Constitutional: He appears well-developed and well-nourished.   HENT:   Tenderness to palpation ethmoid sinuses bilaterally as well as bilateral maxillary sinuses.   Eyes: Pupils are equal, round, and reactive to light.   Neck: Normal range of motion.   Cardiovascular: " Normal rate, regular rhythm and normal heart sounds.   Pulmonary/Chest: Breath sounds normal.   Abdominal: Soft. Bowel sounds are normal.   Musculoskeletal: Normal range of motion.   Neurological: He is alert and oriented to person, place, and time. He has normal strength.   Skin: Skin is warm and dry.   Psychiatric: He has a normal mood and affect. Thought content normal.         ED Course   Procedures  Labs Reviewed - No data to display          Medical Decision Making:   Differential Diagnosis:   Sinusitis, toothache, ALLERGIES, fungal infection.                   ED Course      Clinical Impression:   The encounter diagnosis was Acute non-recurrent frontal sinusitis.    Disposition:   Disposition: Discharged  Condition: Stable                        Yuri Valladares MD  09/22/17 1000

## 2017-09-22 NOTE — ED TRIAGE NOTES
"Pt reports "sinus problem" c/o congestion, pressure behind his eyes and cough. States it started 2 weeks ago. Pt was taking OTC meds but they stopped working  "

## 2018-01-03 ENCOUNTER — HOSPITAL ENCOUNTER (EMERGENCY)
Facility: HOSPITAL | Age: 63
Discharge: HOME | End: 2018-01-03
Attending: EMERGENCY MEDICINE
Payer: MEDICARE

## 2018-01-03 VITALS
HEART RATE: 100 BPM | BODY MASS INDEX: 27.05 KG/M2 | WEIGHT: 205 LBS | OXYGEN SATURATION: 98 % | SYSTOLIC BLOOD PRESSURE: 139 MMHG | RESPIRATION RATE: 20 BRPM | DIASTOLIC BLOOD PRESSURE: 81 MMHG | TEMPERATURE: 99 F

## 2018-01-03 DIAGNOSIS — K59.09 CHRONIC CONSTIPATION: ICD-10-CM

## 2018-01-03 DIAGNOSIS — J06.9 VIRAL UPPER RESPIRATORY TRACT INFECTION: Primary | ICD-10-CM

## 2018-01-03 LAB
FLUAV AG SPEC QL IA: NEGATIVE
FLUBV AG SPEC QL IA: NEGATIVE
SPECIMEN SOURCE: NORMAL

## 2018-01-03 PROCEDURE — 99283 EMERGENCY DEPT VISIT LOW MDM: CPT

## 2018-01-03 PROCEDURE — 87400 INFLUENZA A/B EACH AG IA: CPT | Mod: 59

## 2018-01-03 PROCEDURE — 25000003 PHARM REV CODE 250: Performed by: FAMILY MEDICINE

## 2018-01-03 RX ORDER — ACETAMINOPHEN 500 MG
1000 TABLET ORAL
Status: COMPLETED | OUTPATIENT
Start: 2018-01-03 | End: 2018-01-03

## 2018-01-03 RX ORDER — ACETAMINOPHEN 500 MG
TABLET ORAL
Status: DISCONTINUED
Start: 2018-01-03 | End: 2018-01-03 | Stop reason: HOSPADM

## 2018-01-03 RX ORDER — PREDNISONE 20 MG/1
40 TABLET ORAL DAILY
Qty: 10 TABLET | Refills: 0 | Status: SHIPPED | OUTPATIENT
Start: 2018-01-03 | End: 2018-01-08

## 2018-01-03 RX ADMIN — ACETAMINOPHEN 1000 MG: 500 TABLET ORAL at 07:01

## 2018-01-04 NOTE — ED PROVIDER NOTES
Encounter Date: 1/3/2018       History     Chief Complaint   Patient presents with    Fever     fever and constipation for 1 week, last bowel movement was yesterday.    Constipation     The patient is also complaining of constipation.  He does state that he had a bowel movement yesterday.  He denies there being any difficulty passing his bowel movement, no straining or other abnormalities.  He denies any blood in his stool.      The history is provided by the patient.   URI   The primary symptoms include fever and cough. The current episode started several days ago. This is a new problem. The problem has not changed since onset.The fever began several days ago. The fever has been resolved since its onset. The fever has been present for less than 1 day.   The cough began 6 to 7 days ago. The cough is non-productive.   Symptoms associated with the illness include chills, facial pain and rhinorrhea. The illness is not associated with congestion.     Review of patient's allergies indicates:  No Known Allergies  Past Medical History:   Diagnosis Date    CLL (chronic lymphocytic leukemia)     Diabetes mellitus     Glaucoma     High cholesterol     Hypertension      History reviewed. No pertinent surgical history.  History reviewed. No pertinent family history.  Social History   Substance Use Topics    Smoking status: Never Smoker    Smokeless tobacco: Never Used    Alcohol use No     Review of Systems   Constitutional: Positive for chills and fever.   HENT: Positive for rhinorrhea. Negative for congestion.    Respiratory: Positive for cough.    All other systems reviewed and are negative.      Physical Exam     Initial Vitals [01/03/18 1906]   BP Pulse Resp Temp SpO2   135/79 94 20 (!) 100.5 °F (38.1 °C) 95 %      MAP       97.67         Physical Exam    Nursing note and vitals reviewed.  Constitutional: He appears well-developed and well-nourished.   HENT:   Head: Normocephalic and atraumatic.   Eyes: EOM are  normal.   Neck: Normal range of motion. Neck supple.   Cardiovascular: Normal rate, regular rhythm, normal heart sounds and intact distal pulses.   Pulmonary/Chest: Breath sounds normal.   Abdominal: Soft. He exhibits no distension, no abdominal bruit and no ascites. There is no tenderness. There is no rigidity, no rebound, no guarding, no CVA tenderness, no tenderness at McBurney's point and negative Escalona's sign.   Musculoskeletal: Normal range of motion.   Neurological: He is alert and oriented to person, place, and time.   Skin: Skin is warm and dry. Capillary refill takes less than 2 seconds.   Psychiatric: He has a normal mood and affect. His behavior is normal. Judgment and thought content normal.         ED Course   Procedures  Labs Reviewed   INFLUENZA A AND B ANTIGEN             Medical Decision Making:   Clinical Tests:   Lab Tests: Ordered and Reviewed                   ED Course      Clinical Impression:   The primary encounter diagnosis was Viral upper respiratory tract infection. A diagnosis of Chronic constipation was also pertinent to this visit.    Disposition:   Disposition: Discharged  Condition: Stable                        Deisi Bradley MD  01/03/18 2036

## 2018-10-30 ENCOUNTER — OUTSIDE PLACE OF SERVICE (OUTPATIENT)
Dept: CARDIOLOGY | Facility: CLINIC | Age: 63
End: 2018-10-30
Payer: MEDICARE

## 2018-10-30 PROCEDURE — 93010 ELECTROCARDIOGRAM REPORT: CPT | Mod: S$GLB,,, | Performed by: INTERNAL MEDICINE

## 2019-01-23 ENCOUNTER — OUTSIDE PLACE OF SERVICE (OUTPATIENT)
Dept: CARDIOLOGY | Facility: CLINIC | Age: 64
End: 2019-01-23
Payer: MEDICARE

## 2019-01-23 PROCEDURE — 93010 PR ELECTROCARDIOGRAM REPORT: ICD-10-PCS | Mod: S$GLB,,, | Performed by: STUDENT IN AN ORGANIZED HEALTH CARE EDUCATION/TRAINING PROGRAM

## 2019-01-23 PROCEDURE — 93010 ELECTROCARDIOGRAM REPORT: CPT | Mod: S$GLB,,, | Performed by: STUDENT IN AN ORGANIZED HEALTH CARE EDUCATION/TRAINING PROGRAM

## 2019-07-06 ENCOUNTER — HOSPITAL ENCOUNTER (INPATIENT)
Facility: HOSPITAL | Age: 64
LOS: 3 days | Discharge: HOME HEALTH | DRG: 988 | End: 2019-07-09
Attending: SURGERY | Admitting: FAMILY MEDICINE
Payer: MEDICARE

## 2019-07-06 DIAGNOSIS — N18.30 TYPE 2 DIABETES MELLITUS WITH STAGE 3 CHRONIC KIDNEY DISEASE, WITH LONG-TERM CURRENT USE OF INSULIN: Chronic | ICD-10-CM

## 2019-07-06 DIAGNOSIS — J18.9 PNEUMONIA OF BOTH LUNGS DUE TO INFECTIOUS ORGANISM: ICD-10-CM

## 2019-07-06 DIAGNOSIS — R50.9 FEVER: ICD-10-CM

## 2019-07-06 DIAGNOSIS — D84.9 IMMUNOSUPPRESSION: ICD-10-CM

## 2019-07-06 DIAGNOSIS — K61.1 PERIRECTAL ABSCESS: ICD-10-CM

## 2019-07-06 DIAGNOSIS — D72.829 LEUKOCYTOSIS, UNSPECIFIED TYPE: ICD-10-CM

## 2019-07-06 DIAGNOSIS — C91.10 CLL (CHRONIC LYMPHOCYTIC LEUKEMIA): ICD-10-CM

## 2019-07-06 DIAGNOSIS — Z79.4 TYPE 2 DIABETES MELLITUS WITH STAGE 3 CHRONIC KIDNEY DISEASE, WITH LONG-TERM CURRENT USE OF INSULIN: Chronic | ICD-10-CM

## 2019-07-06 DIAGNOSIS — J18.9 PNEUMONIA OF BOTH LUNGS DUE TO INFECTIOUS ORGANISM, UNSPECIFIED PART OF LUNG: Primary | ICD-10-CM

## 2019-07-06 DIAGNOSIS — E11.22 TYPE 2 DIABETES MELLITUS WITH STAGE 3 CHRONIC KIDNEY DISEASE, WITH LONG-TERM CURRENT USE OF INSULIN: Chronic | ICD-10-CM

## 2019-07-06 DIAGNOSIS — L02.31 ABSCESS OF LEFT BUTTOCK: ICD-10-CM

## 2019-07-06 LAB
ALBUMIN SERPL BCP-MCNC: 3.3 G/DL (ref 3.5–5.2)
ALP SERPL-CCNC: 65 U/L (ref 38–126)
ALT SERPL W/O P-5'-P-CCNC: 18 U/L (ref 10–44)
ANION GAP SERPL CALC-SCNC: 8 MMOL/L (ref 8–16)
AST SERPL-CCNC: 41 U/L (ref 15–46)
BACTERIA #/AREA URNS AUTO: NORMAL /HPF
BASOPHILS # BLD AUTO: 0.04 K/UL (ref 0–0.2)
BASOPHILS NFR BLD: 0.2 % (ref 0–1.9)
BILIRUB SERPL-MCNC: 1.6 MG/DL (ref 0.1–1)
BILIRUB UR QL STRIP: NEGATIVE
BUN SERPL-MCNC: 15 MG/DL (ref 2–20)
CALCIUM SERPL-MCNC: 8.7 MG/DL (ref 8.7–10.5)
CHLORIDE SERPL-SCNC: 105 MMOL/L (ref 95–110)
CLARITY UR REFRACT.AUTO: CLEAR
CO2 SERPL-SCNC: 24 MMOL/L (ref 23–29)
COLOR UR AUTO: YELLOW
CREAT SERPL-MCNC: 0.72 MG/DL (ref 0.5–1.4)
DIFFERENTIAL METHOD: ABNORMAL
EOSINOPHIL # BLD AUTO: 0 K/UL (ref 0–0.5)
EOSINOPHIL NFR BLD: 0.1 % (ref 0–8)
ERYTHROCYTE [DISTWIDTH] IN BLOOD BY AUTOMATED COUNT: 17.9 % (ref 11.5–14.5)
EST. GFR  (AFRICAN AMERICAN): >60 ML/MIN/1.73 M^2
EST. GFR  (NON AFRICAN AMERICAN): >60 ML/MIN/1.73 M^2
ESTIMATED AVG GLUCOSE: 80 MG/DL (ref 68–131)
GLUCOSE SERPL-MCNC: 101 MG/DL (ref 70–110)
GLUCOSE UR QL STRIP: NEGATIVE
HBA1C MFR BLD HPLC: 4.4 % (ref 4–5.6)
HCT VFR BLD AUTO: 26.9 % (ref 40–54)
HGB BLD-MCNC: 8.3 G/DL (ref 14–18)
HGB UR QL STRIP: NEGATIVE
HYALINE CASTS UR QL AUTO: 0 /LPF
KETONES UR QL STRIP: NEGATIVE
LACTATE SERPL-SCNC: 1.4 MMOL/L (ref 0.5–2.2)
LEUKOCYTE ESTERASE UR QL STRIP: NEGATIVE
LYMPHOCYTES # BLD AUTO: 11.7 K/UL (ref 1–4.8)
LYMPHOCYTES NFR BLD: 62.9 % (ref 18–48)
MCH RBC QN AUTO: 31.6 PG (ref 27–31)
MCHC RBC AUTO-ENTMCNC: 30.9 G/DL (ref 32–36)
MCV RBC AUTO: 102 FL (ref 82–98)
MICROSCOPIC COMMENT: NORMAL
MONOCYTES # BLD AUTO: 0.6 K/UL (ref 0.3–1)
MONOCYTES NFR BLD: 3.2 % (ref 4–15)
NEUTROPHILS # BLD AUTO: 6.2 K/UL (ref 1.8–7.7)
NEUTROPHILS NFR BLD: 33.6 % (ref 38–73)
NITRITE UR QL STRIP: NEGATIVE
NRBC BLD-RTO: ABNORMAL /100 WBC
PH UR STRIP: 6 [PH] (ref 5–8)
PLATELET # BLD AUTO: 123 K/UL (ref 150–350)
PMV BLD AUTO: 11 FL (ref 9.2–12.9)
POCT GLUCOSE: 125 MG/DL (ref 70–110)
POTASSIUM SERPL-SCNC: 4.6 MMOL/L (ref 3.5–5.1)
PROT SERPL-MCNC: 6 G/DL (ref 6–8.4)
PROT UR QL STRIP: ABNORMAL
RBC # BLD AUTO: 2.63 M/UL (ref 4.6–6.2)
RBC #/AREA URNS AUTO: 0 /HPF (ref 0–4)
SODIUM SERPL-SCNC: 137 MMOL/L (ref 136–145)
SP GR UR STRIP: 1.01 (ref 1–1.03)
URN SPEC COLLECT METH UR: ABNORMAL
UROBILINOGEN UR STRIP-ACNC: NEGATIVE EU/DL
WBC # BLD AUTO: 18.56 K/UL (ref 3.9–12.7)
WBC #/AREA URNS AUTO: 0 /HPF (ref 0–5)

## 2019-07-06 PROCEDURE — 99285 EMERGENCY DEPT VISIT HI MDM: CPT | Mod: 25,ER

## 2019-07-06 PROCEDURE — 63600175 PHARM REV CODE 636 W HCPCS: Mod: ER | Performed by: SURGERY

## 2019-07-06 PROCEDURE — 11000001 HC ACUTE MED/SURG PRIVATE ROOM

## 2019-07-06 PROCEDURE — 85007 BL SMEAR W/DIFF WBC COUNT: CPT | Mod: ER

## 2019-07-06 PROCEDURE — 93010 EKG 12-LEAD: ICD-10-PCS | Mod: ,,, | Performed by: INTERNAL MEDICINE

## 2019-07-06 PROCEDURE — 85027 COMPLETE CBC AUTOMATED: CPT | Mod: ER

## 2019-07-06 PROCEDURE — 87186 SC STD MICRODIL/AGAR DIL: CPT | Mod: ER

## 2019-07-06 PROCEDURE — 25000003 PHARM REV CODE 250: Performed by: FAMILY MEDICINE

## 2019-07-06 PROCEDURE — 83036 HEMOGLOBIN GLYCOSYLATED A1C: CPT

## 2019-07-06 PROCEDURE — 80053 COMPREHEN METABOLIC PANEL: CPT | Mod: ER

## 2019-07-06 PROCEDURE — 25000003 PHARM REV CODE 250: Mod: ER | Performed by: SURGERY

## 2019-07-06 PROCEDURE — 87070 CULTURE OTHR SPECIMN AEROBIC: CPT | Mod: ER

## 2019-07-06 PROCEDURE — 10061 I&D ABSCESS COMP/MULTIPLE: CPT | Mod: ER

## 2019-07-06 PROCEDURE — 93005 ELECTROCARDIOGRAM TRACING: CPT | Mod: ER

## 2019-07-06 PROCEDURE — 93010 ELECTROCARDIOGRAM REPORT: CPT | Mod: ,,, | Performed by: INTERNAL MEDICINE

## 2019-07-06 PROCEDURE — 83605 ASSAY OF LACTIC ACID: CPT | Mod: ER

## 2019-07-06 PROCEDURE — 36415 COLL VENOUS BLD VENIPUNCTURE: CPT

## 2019-07-06 PROCEDURE — 63600175 PHARM REV CODE 636 W HCPCS: Performed by: FAMILY MEDICINE

## 2019-07-06 PROCEDURE — 96361 HYDRATE IV INFUSION ADD-ON: CPT | Mod: 59,ER

## 2019-07-06 PROCEDURE — 96365 THER/PROPH/DIAG IV INF INIT: CPT | Mod: 59,ER

## 2019-07-06 PROCEDURE — 87077 CULTURE AEROBIC IDENTIFY: CPT | Mod: ER

## 2019-07-06 PROCEDURE — 81000 URINALYSIS NONAUTO W/SCOPE: CPT | Mod: ER

## 2019-07-06 PROCEDURE — 87040 BLOOD CULTURE FOR BACTERIA: CPT | Mod: ER

## 2019-07-06 RX ORDER — VANCOMYCIN HCL IN 5 % DEXTROSE 1G/250ML
1000 PLASTIC BAG, INJECTION (ML) INTRAVENOUS ONCE
Status: DISCONTINUED | OUTPATIENT
Start: 2019-07-06 | End: 2019-07-08

## 2019-07-06 RX ORDER — DOCUSATE CALCIUM 240 MG
240 CAPSULE ORAL 2 TIMES DAILY
COMMUNITY

## 2019-07-06 RX ORDER — VANCOMYCIN HCL IN 5 % DEXTROSE 1G/250ML
1000 PLASTIC BAG, INJECTION (ML) INTRAVENOUS ONCE
Status: COMPLETED | OUTPATIENT
Start: 2019-07-06 | End: 2019-07-06

## 2019-07-06 RX ORDER — ONDANSETRON 8 MG/1
8 TABLET, ORALLY DISINTEGRATING ORAL EVERY 8 HOURS PRN
Status: DISCONTINUED | OUTPATIENT
Start: 2019-07-06 | End: 2019-07-09 | Stop reason: HOSPADM

## 2019-07-06 RX ORDER — HYDROCODONE BITARTRATE AND ACETAMINOPHEN 5; 325 MG/1; MG/1
1 TABLET ORAL EVERY 6 HOURS PRN
Status: DISCONTINUED | OUTPATIENT
Start: 2019-07-06 | End: 2019-07-09 | Stop reason: HOSPADM

## 2019-07-06 RX ORDER — GLUCAGON 1 MG
1 KIT INJECTION
Status: DISCONTINUED | OUTPATIENT
Start: 2019-07-06 | End: 2019-07-09 | Stop reason: HOSPADM

## 2019-07-06 RX ORDER — SODIUM CHLORIDE 0.9 % (FLUSH) 0.9 %
10 SYRINGE (ML) INJECTION
Status: DISCONTINUED | OUTPATIENT
Start: 2019-07-06 | End: 2019-07-09 | Stop reason: HOSPADM

## 2019-07-06 RX ORDER — ENOXAPARIN SODIUM 100 MG/ML
40 INJECTION SUBCUTANEOUS EVERY 24 HOURS
Status: DISCONTINUED | OUTPATIENT
Start: 2019-07-06 | End: 2019-07-09 | Stop reason: HOSPADM

## 2019-07-06 RX ORDER — DORZOLAMIDE HYDROCHLORIDE AND TIMOLOL MALEATE 20; 5 MG/ML; MG/ML
1 SOLUTION/ DROPS OPHTHALMIC 2 TIMES DAILY
Status: DISCONTINUED | OUTPATIENT
Start: 2019-07-06 | End: 2019-07-09 | Stop reason: HOSPADM

## 2019-07-06 RX ORDER — LIDOCAINE HYDROCHLORIDE 20 MG/ML
10 INJECTION, SOLUTION EPIDURAL; INFILTRATION; INTRACAUDAL; PERINEURAL
Status: COMPLETED | OUTPATIENT
Start: 2019-07-06 | End: 2019-07-06

## 2019-07-06 RX ORDER — IBUPROFEN 200 MG
24 TABLET ORAL
Status: DISCONTINUED | OUTPATIENT
Start: 2019-07-06 | End: 2019-07-09 | Stop reason: HOSPADM

## 2019-07-06 RX ORDER — INSULIN ASPART 100 [IU]/ML
0-5 INJECTION, SOLUTION INTRAVENOUS; SUBCUTANEOUS
Status: DISCONTINUED | OUTPATIENT
Start: 2019-07-06 | End: 2019-07-09 | Stop reason: HOSPADM

## 2019-07-06 RX ORDER — ALLOPURINOL 300 MG/1
300 TABLET ORAL DAILY
COMMUNITY

## 2019-07-06 RX ORDER — ACETAMINOPHEN 325 MG/1
650 TABLET ORAL EVERY 6 HOURS PRN
Status: DISCONTINUED | OUTPATIENT
Start: 2019-07-07 | End: 2019-07-09 | Stop reason: HOSPADM

## 2019-07-06 RX ORDER — ATOVAQUONE 750 MG/5ML
SUSPENSION ORAL
COMMUNITY

## 2019-07-06 RX ORDER — OXYCODONE AND ACETAMINOPHEN 5; 325 MG/1; MG/1
1 TABLET ORAL
Status: COMPLETED | OUTPATIENT
Start: 2019-07-06 | End: 2019-07-06

## 2019-07-06 RX ORDER — IBUPROFEN 200 MG
16 TABLET ORAL
Status: DISCONTINUED | OUTPATIENT
Start: 2019-07-06 | End: 2019-07-09 | Stop reason: HOSPADM

## 2019-07-06 RX ORDER — RAMELTEON 8 MG/1
8 TABLET ORAL NIGHTLY PRN
Status: DISCONTINUED | OUTPATIENT
Start: 2019-07-06 | End: 2019-07-09 | Stop reason: HOSPADM

## 2019-07-06 RX ORDER — ENTECAVIR 0.5 MG/1
0.5 TABLET, FILM COATED ORAL DAILY
COMMUNITY

## 2019-07-06 RX ADMIN — VANCOMYCIN HYDROCHLORIDE 1000 MG: 1 INJECTION, POWDER, LYOPHILIZED, FOR SOLUTION INTRAVENOUS at 01:07

## 2019-07-06 RX ADMIN — DORZOLAMIDE HYDROCHLORIDE AND TIMOLOL MALEATE 1 DROP: 20; 5 SOLUTION/ DROPS OPHTHALMIC at 10:07

## 2019-07-06 RX ADMIN — ENOXAPARIN SODIUM 40 MG: 100 INJECTION SUBCUTANEOUS at 09:07

## 2019-07-06 RX ADMIN — CEFTRIAXONE 1 G: 1 INJECTION, SOLUTION INTRAVENOUS at 09:07

## 2019-07-06 RX ADMIN — SODIUM CHLORIDE 1000 ML: 0.9 INJECTION, SOLUTION INTRAVENOUS at 12:07

## 2019-07-06 RX ADMIN — OXYCODONE AND ACETAMINOPHEN 1 TABLET: 5; 325 TABLET ORAL at 12:07

## 2019-07-06 RX ADMIN — AZITHROMYCIN MONOHYDRATE 500 MG: 500 INJECTION, POWDER, LYOPHILIZED, FOR SOLUTION INTRAVENOUS at 09:07

## 2019-07-06 RX ADMIN — LIDOCAINE HYDROCHLORIDE 200 MG: 20 INJECTION, SOLUTION EPIDURAL; INFILTRATION; INTRACAUDAL; PERINEURAL at 12:07

## 2019-07-06 NOTE — SUBJECTIVE & OBJECTIVE
Past Medical History:   Diagnosis Date    CLL (chronic lymphocytic leukemia)     Diabetes mellitus     Glaucoma     High cholesterol     Hypertension        History reviewed. No pertinent surgical history.    Review of patient's allergies indicates:  No Known Allergies    No current facility-administered medications on file prior to encounter.      Current Outpatient Medications on File Prior to Encounter   Medication Sig    allopurinol (ZYLOPRIM) 300 MG tablet Take 300 mg by mouth once daily.    atovaquone (MEPRON) 750 mg/5 mL Susp Take by mouth.    docusate calcium (SURFAK) 240 mg capsule Take 240 mg by mouth 2 (two) times daily.    entecavir (BARACLUDE) 0.5 MG Tab Take 0.5 mg by mouth once daily.    amlodipine (NORVASC) 10 MG tablet Take 10 mg by mouth once daily.    aspirin 81 MG Chew Take 81 mg by mouth once daily.    atorvastatin (LIPITOR) 20 MG tablet Take 20 mg by mouth once daily.    dorzolamide-timolol 2-0.5% (COSOPT) 22.3-6.8 mg/mL ophthalmic solution 1 drop 2 (two) times daily.    ibrutinib 140 mg Cap Take 420 mg by mouth once daily.     insulin NPH (NOVOLIN N) 100 unit/mL injection Inject into the skin 2 (two) times daily before meals.    multivitamin-zinc gluconate 5 mg/mL Drop Take by mouth.    potassium chloride (KLOR-CON) 10 MEQ TbSR Take 10 mEq by mouth once daily.     Family History     None        Tobacco Use    Smoking status: Never Smoker    Smokeless tobacco: Never Used   Substance and Sexual Activity    Alcohol use: No    Drug use: No    Sexual activity: Not on file     Review of Systems   Constitutional: Negative for chills and fever.   HENT: Negative for congestion and rhinorrhea.    Eyes: Negative for discharge.   Respiratory: Negative for chest tightness and shortness of breath.    Cardiovascular: Negative for chest pain and palpitations.   Gastrointestinal: Negative for abdominal pain and nausea.   Genitourinary: Negative for dysuria and hematuria.    Musculoskeletal: Positive for myalgias. Negative for arthralgias.   Skin: Positive for color change, rash and wound.        S/P I&D left buttock     Neurological: Positive for weakness. Negative for headaches.   Psychiatric/Behavioral: Negative for decreased concentration and sleep disturbance. The patient is not nervous/anxious.      Objective:     Vital Signs (Most Recent):  Temp: 98.4 °F (36.9 °C) (07/06/19 0940)  Pulse: 84 (07/06/19 1300)  Resp: 19 (07/06/19 1300)  BP: (!) 95/52 (07/06/19 1300)  SpO2: 99 % (07/06/19 1300) Vital Signs (24h Range):  Temp:  [98.4 °F (36.9 °C)] 98.4 °F (36.9 °C)  Pulse:  [] 84  Resp:  [17-19] 19  SpO2:  [99 %] 99 %  BP: ()/(52-65) 95/52     Weight: 70.3 kg (155 lb)  Body mass index is 20.45 kg/m².    Physical Exam   Constitutional: He is oriented to person, place, and time. He appears well-developed and well-nourished.   HENT:   Head: Normocephalic and atraumatic.   Eyes: Pupils are equal, round, and reactive to light. EOM are normal.   Neck: Normal range of motion. Neck supple.   Cardiovascular: Normal rate, regular rhythm and normal heart sounds. Exam reveals no gallop and no friction rub.   No murmur heard.  Pulmonary/Chest: Effort normal and breath sounds normal. No respiratory distress.   Decreased breath sounds both bases    Abdominal: Soft. Bowel sounds are normal. There is no tenderness.   Musculoskeletal: Normal range of motion. He exhibits tenderness. He exhibits no edema.   Neurological: He is alert and oriented to person, place, and time.   Skin: Capillary refill takes less than 2 seconds. There is erythema.   Wound dressing to left buttock clean and dry   Psychiatric: His speech is normal and behavior is normal. Thought content normal. His mood appears not anxious. Cognition and memory are normal. He does not exhibit a depressed mood.         CRANIAL NERVES     CN III, IV, VI   Pupils are equal, round, and reactive to light.  Extraocular motions are  normal.        Significant Labs:   CBC:   Recent Labs   Lab 07/06/19  1212   WBC 18.56*   HGB 8.3*   HCT 26.9*   *     CMP:   Recent Labs   Lab 07/06/19  1212      K 4.6      CO2 24      BUN 15   CREATININE 0.72   CALCIUM 8.7   PROT 6.0   ALBUMIN 3.3*   BILITOT 1.6*   ALKPHOS 65   AST 41   ALT 18   ANIONGAP 8   EGFRNONAA >60.0     Lactic Acid:   Recent Labs   Lab 07/06/19  1212   LACTATE 1.4       Recent Lab Results       07/06/19  1212        Albumin 3.3     Alkaline Phosphatase 65     ALT 18     Anion Gap 8     AST 41     Baso # 0.04     Basophil% 0.2     BILIRUBIN TOTAL 1.6  Comment:  For infants and newborns, interpretation of results should be based  on gestational age, weight and in agreement with clinical  observations.  Premature Infant recommended reference ranges:  Up to 24 hours.............<8.0 mg/dL  Up to 48 hours............<12.0 mg/dL  3-5 days..................<15.0 mg/dL  6-29 days.................<15.0 mg/dL       BUN, Bld 15     Calcium 8.7     Chloride 105     CO2 24     Creatinine 0.72     Differential Method Automated     eGFR if African American >60.0     eGFR if non  >60.0  Comment:  Calculation used to obtain the estimated glomerular filtration  rate (eGFR) is the CKD-EPI equation.        Eos # 0.0     Eosinophil% 0.1     Glucose 101     Gran # (ANC) 6.2     Gran% 33.6     Hematocrit 26.9     Hemoglobin 8.3     Lactate, Humphrey 1.4     Lymph # 11.7     Lymph% 62.9     MCH 31.6     MCHC 30.9          Mono # 0.6     Mono% 3.2     MPV 11.0     Platelets 123     Potassium 4.6     PROTEIN TOTAL 6.0     RBC 2.63     RDW 17.9     Sodium 137     WBC 18.56           Significant Imaging: I have reviewed all pertinent imaging results/findings within the past 24 hours.

## 2019-07-06 NOTE — ASSESSMENT & PLAN NOTE
CXr reported Infiltrates involving the right mid lung and left lower lobe concerning for pneumonia.  Continue Vanc  Azithromycin/Rocephin

## 2019-07-06 NOTE — HPI
Kevin Mancilla is a 64 y.o. black man with former cigarette smoking, hypertension, hyperlipidemia, diabetes mellitus type 2, chronic kidney disease stage 3, glaucoma, chronic lymphocytic leukemia. He lives in [place], [place]. He is . His primary care clinic is the [facility] in [place].   He presented to [facility] on 7/6/19 with an abscess on his left sacral area that he noticed 3 days prior, with erythema, pain, and swelling. Labs showed leukocytosis (WBC 85233), anemia (hemoglobin 8.3 and hematocrit 26.9, compared to 11.9 and 36.2 on 7/7/17). Chest X-ray showed infiltrates in the right middle lobe and left lower lobe concerning for pneumonia. He had cough but no shortness of breath or hypoxia. The abscess was incised and drained and the wound was packed with iodoform. He was admitted to [facility].

## 2019-07-06 NOTE — ED NOTES
Attempted to call report, nurse states she is in with a new admission to call back in 5 min, will call back in 5 min.

## 2019-07-06 NOTE — ED PROVIDER NOTES
"Encounter Date: 7/6/2019       History     Chief Complaint   Patient presents with    wound to skin     "I got a wound to my butt like a boil since last Wednesday 7/3/19 and I am a diabetic'      Patient complains of the left buttock boil for for 4 days.  He states that the boil is painful swollen red getting bigger since Wednesday.  He has a history of CLL evidently in remission he is a diabetic.  [facility] [place]    The history is provided by the patient.   Abscess    This is a new problem. The current episode started several days ago. The problem occurs occasionally. The problem has been gradually worsening. The abscess is present on the left buttock. The pain is at a severity of 5/10. The abscess is characterized by redness, painfulness and swelling.     Review of patient's allergies indicates:  No Known Allergies  Past Medical History:   Diagnosis Date    CLL (chronic lymphocytic leukemia)     Diabetes mellitus     Glaucoma     High cholesterol     Hypertension      History reviewed. No pertinent surgical history.  History reviewed. No pertinent family history.  Social History     Tobacco Use    Smoking status: Never Smoker    Smokeless tobacco: Never Used   Substance Use Topics    Alcohol use: No    Drug use: No     Review of Systems   Constitutional: Negative.    HENT: Negative.    Eyes: Negative.    Respiratory: Negative.    Cardiovascular: Negative.    Gastrointestinal: Negative.    Endocrine: Negative.    Genitourinary: Negative.    Musculoskeletal: Negative.    Skin: Positive for wound.   Allergic/Immunologic: Negative.    Neurological: Negative.    Hematological: Negative.    Psychiatric/Behavioral: Negative.        Physical Exam     Initial Vitals [07/06/19 0940]   BP Pulse Resp Temp SpO2   121/65 102 17 98.4 °F (36.9 °C) 99 %      MAP       --         Physical Exam    Nursing note and vitals reviewed.  Constitutional: He appears well-developed and " well-nourished.   HENT:   Head: Normocephalic.   Eyes: Conjunctivae are normal.   Neck: Normal range of motion.   Cardiovascular: Normal rate.   Pulmonary/Chest:   Decreased breath sounds both bases   Abdominal: Soft.   Neurological: He is alert and oriented to person, place, and time. He has normal strength. GCS score is 15. GCS eye subscore is 4. GCS verbal subscore is 5. GCS motor subscore is 6.   Skin: Skin is warm.        Psychiatric: He has a normal mood and affect.         ED Course   I & D - Incision and Drainage  Date/Time: 7/6/2019 2:46 PM  Performed by: DAVEY Becerril III, MD  Authorized by: DAVEY Becerril III, MD   Consent Done: Yes  Consent: Verbal consent obtained.  Risks and benefits: risks, benefits and alternatives were discussed  Consent given by: patient  Patient understanding: patient states understanding of the procedure being performed  Patient consent: the patient's understanding of the procedure matches consent given  Type: abscess    Anesthesia:  Local Anesthetic: lidocaine 1% with epinephrine  Anesthetic total: 10 mL  Patient sedated: no  Scalpel size: 11  Incision type: single straight  Complexity: complex  Drainage: pus  Drainage amount: moderate  Wound treatment: wound packed  Packing material: 1/2 in iodoform gauze  Complications: No  Specimens: No  Implants: No  Patient tolerance: Patient tolerated the procedure well with no immediate complications  Comments: Wound culture was done wound was packed with half-inch iodoform        Labs Reviewed   CBC W/ AUTO DIFFERENTIAL - Abnormal; Notable for the following components:       Result Value    WBC 18.56 (*)     RBC 2.63 (*)     Hemoglobin 8.3 (*)     Hematocrit 26.9 (*)     Mean Corpuscular Volume 102 (*)     Mean Corpuscular Hemoglobin 31.6 (*)     Mean Corpuscular Hemoglobin Conc 30.9 (*)     RDW 17.9 (*)     Platelets 123 (*)     Lymph # 11.7 (*)     Gran% 33.6 (*)     Lymph% 62.9 (*)     Mono% 3.2 (*)     All other components  within normal limits   COMPREHENSIVE METABOLIC PANEL - Abnormal; Notable for the following components:    Albumin 3.3 (*)     Total Bilirubin 1.6 (*)     All other components within normal limits   CULTURE, BLOOD   CULTURE, AEROBIC  (SPECIFY SOURCE)   LACTIC ACID, PLASMA   URINALYSIS, REFLEX TO URINE CULTURE     EKG Readings: (Independently Interpreted)   Rhythm: Normal Sinus Rhythm. Ectopy: No Ectopy. Conduction: Normal. ST Segments: Normal ST Segments. T Waves: Normal. Clinical Impression: Normal Sinus Rhythm       Imaging Results          X-Ray Chest AP Portable (Final result)  Result time 07/06/19 14:24:16    Final result by Jaden Plunkett Jr., MD (07/06/19 14:24:16)                 Impression:      Infiltrates involving the right mid lung and left lower lobe concerning for pneumonia.      Electronically signed by: Jaden Plunkett Jr., MD  Date:    07/06/2019  Time:    14:24             Narrative:    EXAMINATION:  XR CHEST AP PORTABLE    CLINICAL HISTORY:  Fever;    COMPARISON:  None    FINDINGS:  Left lower lobe patchy infiltrate.  Early infiltrate in the right upper lobe.  No effusion or pneumothorax.  Cardiac silhouette mediastinum unremarkable.  Tortuous thoracic aorta.                                 Medical Decision Making:   Initial Assessment:   Large left perirectal abscess with leukocytosis and bilateral lung infiltrates with history of CLL  ED Management:  [facility] is [facility] who [facility] and has history of CLL comes in with a 4 day history of left perirectal abscess.  The abscess was drained here with culture and sensitivities done of the drainage and blood cultures were drawn. patient was given 1 loading dose of vancomycin per pharmacy protocol.  Chest x-ray shows bilateral infiltrates.  Awaiting urine studies.  [facility]                      Clinical Impression:       ICD-10-CM ICD-9-CM   1. Pneumonia of both lungs due to  infectious organism, unspecified part of lung J18.9 483.8   2. Fever R50.9 780.60   3. Leukocytosis, unspecified type D72.829 288.60   4. Perirectal abscess K61.1 566   5. CLL (chronic lymphocytic leukemia) C91.90 204.10   6. Immunosuppression D89.9 279.9         Disposition:   Disposition: Admitted  Condition: Critical                        DAVEY Becerril III, MD  07/06/19 1449       DAVEY Becerril III, MD  07/06/19 0021

## 2019-07-07 PROBLEM — E83.42 HYPOMAGNESEMIA: Status: ACTIVE | Noted: 2019-07-07

## 2019-07-07 PROBLEM — E11.22 TYPE 2 DIABETES MELLITUS WITH STAGE 3 CHRONIC KIDNEY DISEASE, WITH LONG-TERM CURRENT USE OF INSULIN: Chronic | Status: ACTIVE | Noted: 2017-07-03

## 2019-07-07 PROBLEM — C91.10 CLL (CHRONIC LYMPHOCYTIC LEUKEMIA): Chronic | Status: ACTIVE | Noted: 2017-07-05

## 2019-07-07 PROBLEM — D84.9 IMMUNOSUPPRESSED STATUS: Chronic | Status: ACTIVE | Noted: 2017-07-05

## 2019-07-07 PROBLEM — E87.6 HYPOKALEMIA: Status: ACTIVE | Noted: 2019-07-07

## 2019-07-07 PROBLEM — I10 ESSENTIAL HYPERTENSION: Chronic | Status: ACTIVE | Noted: 2017-07-05

## 2019-07-07 PROBLEM — N18.30 TYPE 2 DIABETES MELLITUS WITH STAGE 3 CHRONIC KIDNEY DISEASE, WITH LONG-TERM CURRENT USE OF INSULIN: Chronic | Status: ACTIVE | Noted: 2017-07-03

## 2019-07-07 PROBLEM — E78.5 HYPERLIPIDEMIA: Chronic | Status: ACTIVE | Noted: 2017-07-05

## 2019-07-07 PROBLEM — Z79.4 TYPE 2 DIABETES MELLITUS WITH STAGE 3 CHRONIC KIDNEY DISEASE, WITH LONG-TERM CURRENT USE OF INSULIN: Chronic | Status: ACTIVE | Noted: 2017-07-03

## 2019-07-07 LAB
ALBUMIN SERPL BCP-MCNC: 2.8 G/DL (ref 3.5–5.2)
ALP SERPL-CCNC: 77 U/L (ref 55–135)
ALT SERPL W/O P-5'-P-CCNC: 12 U/L (ref 10–44)
ANION GAP SERPL CALC-SCNC: 8 MMOL/L (ref 8–16)
AST SERPL-CCNC: 16 U/L (ref 10–40)
BASOPHILS # BLD AUTO: 0.01 K/UL (ref 0–0.2)
BASOPHILS NFR BLD: 0.1 % (ref 0–1.9)
BILIRUB SERPL-MCNC: 1.6 MG/DL (ref 0.1–1)
BUN SERPL-MCNC: 12 MG/DL (ref 8–23)
CALCIUM SERPL-MCNC: 8.5 MG/DL (ref 8.7–10.5)
CHLORIDE SERPL-SCNC: 107 MMOL/L (ref 95–110)
CO2 SERPL-SCNC: 22 MMOL/L (ref 23–29)
CREAT SERPL-MCNC: 0.8 MG/DL (ref 0.5–1.4)
DIFFERENTIAL METHOD: ABNORMAL
EOSINOPHIL # BLD AUTO: 0 K/UL (ref 0–0.5)
EOSINOPHIL NFR BLD: 0.1 % (ref 0–8)
ERYTHROCYTE [DISTWIDTH] IN BLOOD BY AUTOMATED COUNT: 17.2 % (ref 11.5–14.5)
EST. GFR  (AFRICAN AMERICAN): >60 ML/MIN/1.73 M^2
EST. GFR  (NON AFRICAN AMERICAN): >60 ML/MIN/1.73 M^2
GLUCOSE SERPL-MCNC: 87 MG/DL (ref 70–110)
HCT VFR BLD AUTO: 23.9 % (ref 40–54)
HGB BLD-MCNC: 7.5 G/DL (ref 14–18)
LYMPHOCYTES # BLD AUTO: 11 K/UL (ref 1–4.8)
LYMPHOCYTES NFR BLD: 66.4 % (ref 18–48)
MAGNESIUM SERPL-MCNC: 1.4 MG/DL (ref 1.6–2.6)
MCH RBC QN AUTO: 31.1 PG (ref 27–31)
MCHC RBC AUTO-ENTMCNC: 31.4 G/DL (ref 32–36)
MCV RBC AUTO: 99 FL (ref 82–98)
MONOCYTES # BLD AUTO: 0.6 K/UL (ref 0.3–1)
MONOCYTES NFR BLD: 3.6 % (ref 4–15)
NEUTROPHILS # BLD AUTO: 4.9 K/UL (ref 1.8–7.7)
NEUTROPHILS NFR BLD: 29.8 % (ref 38–73)
PLATELET # BLD AUTO: 127 K/UL (ref 150–350)
PMV BLD AUTO: 10.9 FL (ref 9.2–12.9)
POCT GLUCOSE: 104 MG/DL (ref 70–110)
POCT GLUCOSE: 120 MG/DL (ref 70–110)
POCT GLUCOSE: 128 MG/DL (ref 70–110)
POCT GLUCOSE: 86 MG/DL (ref 70–110)
POTASSIUM SERPL-SCNC: 3.3 MMOL/L (ref 3.5–5.1)
PROT SERPL-MCNC: 4.7 G/DL (ref 6–8.4)
RBC # BLD AUTO: 2.41 M/UL (ref 4.6–6.2)
SODIUM SERPL-SCNC: 137 MMOL/L (ref 136–145)
WBC # BLD AUTO: 16.63 K/UL (ref 3.9–12.7)

## 2019-07-07 PROCEDURE — 85060 BLOOD SMEAR INTERPRETATION: CPT | Mod: ,,, | Performed by: PATHOLOGY

## 2019-07-07 PROCEDURE — 94761 N-INVAS EAR/PLS OXIMETRY MLT: CPT

## 2019-07-07 PROCEDURE — 85060 PATHOLOGIST REVIEW: ICD-10-PCS | Mod: ,,, | Performed by: PATHOLOGY

## 2019-07-07 PROCEDURE — 83735 ASSAY OF MAGNESIUM: CPT

## 2019-07-07 PROCEDURE — 36415 COLL VENOUS BLD VENIPUNCTURE: CPT

## 2019-07-07 PROCEDURE — 63600175 PHARM REV CODE 636 W HCPCS: Performed by: FAMILY MEDICINE

## 2019-07-07 PROCEDURE — 25000003 PHARM REV CODE 250: Performed by: FAMILY MEDICINE

## 2019-07-07 PROCEDURE — 11000001 HC ACUTE MED/SURG PRIVATE ROOM

## 2019-07-07 PROCEDURE — 85025 COMPLETE CBC W/AUTO DIFF WBC: CPT

## 2019-07-07 PROCEDURE — 25000003 PHARM REV CODE 250: Performed by: NURSE PRACTITIONER

## 2019-07-07 PROCEDURE — 80053 COMPREHEN METABOLIC PANEL: CPT

## 2019-07-07 RX ORDER — POTASSIUM CHLORIDE 20 MEQ/1
20 TABLET, EXTENDED RELEASE ORAL DAILY
Status: DISCONTINUED | OUTPATIENT
Start: 2019-07-07 | End: 2019-07-09 | Stop reason: HOSPADM

## 2019-07-07 RX ORDER — LANOLIN ALCOHOL/MO/W.PET/CERES
400 CREAM (GRAM) TOPICAL ONCE
Status: COMPLETED | OUTPATIENT
Start: 2019-07-07 | End: 2019-07-07

## 2019-07-07 RX ORDER — MAGNESIUM SULFATE HEPTAHYDRATE 40 MG/ML
2 INJECTION, SOLUTION INTRAVENOUS ONCE
Status: COMPLETED | OUTPATIENT
Start: 2019-07-07 | End: 2019-07-07

## 2019-07-07 RX ADMIN — MAGNESIUM OXIDE TAB 400 MG (241.3 MG ELEMENTAL MG) 400 MG: 400 (241.3 MG) TAB at 09:07

## 2019-07-07 RX ADMIN — DORZOLAMIDE HYDROCHLORIDE AND TIMOLOL MALEATE 1 DROP: 20; 5 SOLUTION/ DROPS OPHTHALMIC at 08:07

## 2019-07-07 RX ADMIN — ACETAMINOPHEN 650 MG: 325 TABLET ORAL at 12:07

## 2019-07-07 RX ADMIN — CEFTRIAXONE 1 G: 1 INJECTION, SOLUTION INTRAVENOUS at 08:07

## 2019-07-07 RX ADMIN — DORZOLAMIDE HYDROCHLORIDE AND TIMOLOL MALEATE 1 DROP: 20; 5 SOLUTION/ DROPS OPHTHALMIC at 09:07

## 2019-07-07 RX ADMIN — ENOXAPARIN SODIUM 40 MG: 100 INJECTION SUBCUTANEOUS at 05:07

## 2019-07-07 RX ADMIN — POTASSIUM CHLORIDE 20 MEQ: 20 TABLET, EXTENDED RELEASE ORAL at 09:07

## 2019-07-07 RX ADMIN — AZITHROMYCIN MONOHYDRATE 500 MG: 500 INJECTION, POWDER, LYOPHILIZED, FOR SOLUTION INTRAVENOUS at 08:07

## 2019-07-07 RX ADMIN — MAGNESIUM SULFATE IN WATER 2 G: 40 INJECTION, SOLUTION INTRAVENOUS at 09:07

## 2019-07-07 NOTE — SUBJECTIVE & OBJECTIVE
Interval History: awake and alert, eating breakfast, family bedside.     Replace k and mag  Monitor H/H      Review of Systems   Constitutional: Negative for chills and fever.   Respiratory: Negative for shortness of breath.    Cardiovascular: Negative for chest pain and palpitations.   Gastrointestinal: Negative for abdominal pain and nausea.   Musculoskeletal: Positive for myalgias. Negative for arthralgias.   Skin: Positive for color change. Negative for wound.        S/P I&D left buttock     Objective:     Vital Signs (Most Recent):  Temp: 98.2 °F (36.8 °C) (07/07/19 0621)  Pulse: 82 (07/07/19 0621)  Resp: 18 (07/07/19 0621)  BP: 106/60 (07/07/19 0621)  SpO2: 100 % (07/07/19 0621) Vital Signs (24h Range):  Temp:  [98.2 °F (36.8 °C)-100.9 °F (38.3 °C)] 98.2 °F (36.8 °C)  Pulse:  [] 82  Resp:  [15-19] 18  SpO2:  [98 %-100 %] 100 %  BP: ()/(52-74) 106/60     Weight: 75.3 kg (166 lb 0.1 oz)  Body mass index is 21.9 kg/m².    Intake/Output Summary (Last 24 hours) at 7/7/2019 0744  Last data filed at 7/7/2019 0600  Gross per 24 hour   Intake 1550 ml   Output 350 ml   Net 1200 ml      Physical Exam   Constitutional: He is oriented to person, place, and time. He appears well-developed and well-nourished.   HENT:   Head: Normocephalic and atraumatic.   Cardiovascular: Normal rate, regular rhythm and normal heart sounds. Exam reveals no gallop and no friction rub.   No murmur heard.  Pulmonary/Chest: Effort normal and breath sounds normal. No respiratory distress.   Decreased breath sounds both bases    Abdominal: Soft. Bowel sounds are normal. There is no tenderness.   Musculoskeletal: Normal range of motion. He exhibits tenderness. He exhibits no edema.   Neurological: He is alert and oriented to person, place, and time.   Skin: Capillary refill takes less than 2 seconds. There is erythema.   Induration around the wound. Mildly tender.    Psychiatric: His speech is normal and behavior is normal. Thought  content normal. His mood appears not anxious. Cognition and memory are normal. He does not exhibit a depressed mood.       Significant Labs:   Blood Culture:   Recent Labs   Lab 07/06/19  1212   LABBLOO No Growth to date     CBC:   Recent Labs   Lab 07/06/19  1212 07/07/19  0323   WBC 18.56* 16.63*   HGB 8.3* 7.5*   HCT 26.9* 23.9*   * 127*     CMP:   Recent Labs   Lab 07/06/19  1212 07/07/19  0323    137   K 4.6 3.3*    107   CO2 24 22*    87   BUN 15 12   CREATININE 0.72 0.8   CALCIUM 8.7 8.5*   PROT 6.0 4.7*   ALBUMIN 3.3* 2.8*   BILITOT 1.6* 1.6*   ALKPHOS 65 77   AST 41 16   ALT 18 12   ANIONGAP 8 8   EGFRNONAA >60.0 >60     Magnesium:   Recent Labs   Lab 07/07/19  0323   MG 1.4*     Prealbumin: No results for input(s): PREALBUMIN in the last 48 hours.  Troponin: No results for input(s): TROPONINI in the last 48 hours.  Urine Culture: No results for input(s): LABURIN in the last 48 hours.  Urine Studies:   Recent Labs   Lab 07/06/19  1541   COLORU Yellow   APPEARANCEUA Clear   PHUR 6.0   SPECGRAV 1.015   PROTEINUA 1+*   GLUCUA Negative   KETONESU Negative   BILIRUBINUA Negative   OCCULTUA Negative   NITRITE Negative   UROBILINOGEN Negative   LEUKOCYTESUR Negative   RBCUA 0   WBCUA 0   BACTERIA None   HYALINECASTS 0       Significant Imaging: I have reviewed all pertinent imaging results/findings within the past 24 hours.

## 2019-07-07 NOTE — PLAN OF CARE
Problem: Adult Inpatient Plan of Care  Goal: Plan of Care Review  Outcome: Ongoing (interventions implemented as appropriate)  Patient remained in bed with bed alarm on, yellow wrist band and nonskid yelllow socks. Bed in lowest position, wheels locked and call light within reach. Plan of care reviewed with patient and spouse. Patient positioned self in bed. Ambulate with assistance. IVs remained intact. Vital signs remained stable. Temp of 100.9 at 2400. Gave tylenol.  NSR/ST on telemetry. No  pain noted during shift. Left buttock dressed in mepelex.  Will continue to monitor.

## 2019-07-07 NOTE — PROGRESS NOTES
[facility] Medicine  Progress Note    Patient Name: Kevin Mancilla  MRN: [id number]  Patient Class: IP- Inpatient   Admission Date: 7/6/2019  Length of Stay: 1 days  Attending Physician: Shivani Bartlett*  Primary Care Provider: Primary Doctor No        Subjective:     Principal Problem:Pneumonia of both lungs due to infectious organism      HPI:  Kevin Mancilla is a 60 year old male with a past medical history of Chronic lymphocytic leukemia, Diabetes Mellitus, Glaucoma, Hyperlipidemia and Hypertension. He lives in [place], [place]  With his wife. He has no established PCP but [facility] [place], [place]    He presented to [facility] ED 7/6/2019 with complaints of an abscess to his left sacral area that he noticed 3 days ago. Associated symptoms include redness, pain and swelling. He rates the pain 5/10. I&D performed at [facility] ED, Wound culture was done wound was packed with half-inch iodoform. Further ED workup revealed WBC (18.56), Hgb (8.3), Hct (26.9), Albumin (3.3), total bilirubin (1.6), lactate wnl, CXR revealed Infiltrates involving the right mid lung and left lower lobe concerning for pneumonia. Transferred to [facility], admitted for further evaluation and treatment.     Overview/Hospital Course:  No notes on file    Interval History: awake and alert, eating breakfast, family bedside.     Replace k and mag  Monitor H/H      Review of Systems   Constitutional: Negative for chills and fever.   Respiratory: Negative for shortness of breath.    Cardiovascular: Negative for chest pain and palpitations.   Gastrointestinal: Negative for abdominal pain and nausea.   Musculoskeletal: Positive for myalgias. Negative for arthralgias.   Skin: Positive for color change. Negative for wound.        S/P I&D left buttock     Objective:     Vital Signs (Most Recent):  Temp: 98.2 °F (36.8 °C) (07/07/19 0621)  Pulse: 82 (07/07/19 0621)  Resp: 18 (07/07/19  0621)  BP: 106/60 (07/07/19 0621)  SpO2: 100 % (07/07/19 0621) Vital Signs (24h Range):  Temp:  [98.2 °F (36.8 °C)-100.9 °F (38.3 °C)] 98.2 °F (36.8 °C)  Pulse:  [] 82  Resp:  [15-19] 18  SpO2:  [98 %-100 %] 100 %  BP: ()/(52-74) 106/60     Weight: 75.3 kg (166 lb 0.1 oz)  Body mass index is 21.9 kg/m².    Intake/Output Summary (Last 24 hours) at 7/7/2019 0744  Last data filed at 7/7/2019 0600  Gross per 24 hour   Intake 1550 ml   Output 350 ml   Net 1200 ml      Physical Exam   Constitutional: He is oriented to person, place, and time. He appears well-developed and well-nourished.   HENT:   Head: Normocephalic and atraumatic.   Cardiovascular: Normal rate, regular rhythm and normal heart sounds. Exam reveals no gallop and no friction rub.   No murmur heard.  Pulmonary/Chest: Effort normal and breath sounds normal. No respiratory distress.   Decreased breath sounds both bases    Abdominal: Soft. Bowel sounds are normal. There is no tenderness.   Musculoskeletal: Normal range of motion. He exhibits tenderness. He exhibits no edema.   Neurological: He is alert and oriented to person, place, and time.   Skin: Capillary refill takes less than 2 seconds. There is erythema.   Induration around the wound. Mildly tender.    Psychiatric: His speech is normal and behavior is normal. Thought content normal. His mood appears not anxious. Cognition and memory are normal. He does not exhibit a depressed mood.       Significant Labs:   Blood Culture:   Recent Labs   Lab 07/06/19  1212   LABBLOO No Growth to date     CBC:   Recent Labs   Lab 07/06/19  1212 07/07/19  0323   WBC 18.56* 16.63*   HGB 8.3* 7.5*   HCT 26.9* 23.9*   * 127*     CMP:   Recent Labs   Lab 07/06/19  1212 07/07/19  0323    137   K 4.6 3.3*    107   CO2 24 22*    87   BUN 15 12   CREATININE 0.72 0.8   CALCIUM 8.7 8.5*   PROT 6.0 4.7*   ALBUMIN 3.3* 2.8*   BILITOT 1.6* 1.6*   ALKPHOS 65 77   AST 41 16   ALT 18 12   ANIONGAP 8  8   EGFRNONAA >60.0 >60     Magnesium:   Recent Labs   Lab 07/07/19  0323   MG 1.4*     Prealbumin: No results for input(s): PREALBUMIN in the last 48 hours.  Troponin: No results for input(s): TROPONINI in the last 48 hours.  Urine Culture: No results for input(s): LABURIN in the last 48 hours.  Urine Studies:   Recent Labs   Lab 07/06/19  1541   COLORU Yellow   APPEARANCEUA Clear   PHUR 6.0   SPECGRAV 1.015   PROTEINUA 1+*   GLUCUA Negative   KETONESU Negative   BILIRUBINUA Negative   OCCULTUA Negative   NITRITE Negative   UROBILINOGEN Negative   LEUKOCYTESUR Negative   RBCUA 0   WBCUA 0   BACTERIA None   HYALINECASTS 0       Significant Imaging: I have reviewed all pertinent imaging results/findings within the past 24 hours.      Assessment/Plan:      * Pneumonia of both lungs due to infectious organism  CXr reported Infiltrates involving the right mid lung and left lower lobe concerning for pneumonia.  Continue Vanc  Azithromycin/Rocephin      Hypomagnesemia  Replace       Hypokalemia  Replace       Hyperlipidemia  Continue Lipitor      Essential hypertension  Hold med       CLL (chronic lymphocytic leukemia)  Immunosuppression status  Hold Baraclude and Ibrutinid      Type 2 diabetes mellitus with stage 3 chronic kidney disease, with long-term current use of insulin  Levemir  Low dose SSI  Accucheck  Diabetic diet      Left Buttocks Abscess  S/p I and D in the ED  Continue Vanc   FU culture  Consult wound care  Dress wound with Iodoform strip        VTE Risk Mitigation (From admission, onward)        Ordered     enoxaparin injection 40 mg  Daily      07/06/19 2101     IP VTE HIGH RISK PATIENT  Once      07/06/19 2101                Shivani Bartlett MD  Department of Hospital Medicine   [facility]

## 2019-07-07 NOTE — ASSESSMENT & PLAN NOTE
S/p I and D in the ED  Continue Vanc   FU culture  Consult wound care  Dress wound with Iodoform strip

## 2019-07-07 NOTE — PLAN OF CARE
Problem: Adult Inpatient Plan of Care  Goal: Plan of Care Review  Outcome: Ongoing (interventions implemented as appropriate)     07/07/19 8721   Plan of Care Review   Plan of Care Reviewed With patient;spouse   Plan of care reviewed with patient and the wife. Verbal reported of understanding. AAOx4. Blood glucose closely monitored. Free of falls. NSR on monitor with no red alarms noted. HR 80s. Ambulated with assistance. No complaint of pain throughout the shift. Left buttock dressed with Mepilex. No acute distress noted. Bed in lowest position. Bed alarm on. Wheel locked. Head of bed elevated. Call bell within reach. Side rails x 2 are up. Patient will be monitored overnight.

## 2019-07-07 NOTE — H&P
"[facility] Medicine  History & Physical    Patient Name: Kevin Mancilla  MRN: [id number]  Admission Date: 7/6/2019  Attending Physician: Shivani Bartlett*   Primary Care Provider: Primary Doctor No         Patient information was obtained from patient and ER records.     Subjective:     Principal Problem:Pneumonia of both lungs due to infectious organism    Chief Complaint:   Chief Complaint   Patient presents with    wound to skin     "I got a wound to my butt like a boil since last Wednesday 7/3/19 and I am a diabetic'         HPI: Kevin Mancilla is a 60 year old male with a past medical history of Chronic lymphocytic leukemia, Diabetes Mellitus, Glaucoma, Hyperlipidemia and Hypertension. He lives in [place], [place].  With his wife. He has no established PCP but [facility] in [place], [place]    He presented to [facility] ED 7/6/2019 with complaints of an abscess to his left sacral area that he noticed 3 days ago. Associated symptoms include redness, pain and swelling. He rates the pain 5/10. I&D performed at [facility] ED, Wound culture was done wound was packed with half-inch iodoform. Further ED workup revealed WBC (18.56), Hgb (8.3), Hct (26.9), Albumin (3.3), total bilirubin (1.6), lactate wnl, CXR revealed Infiltrates involving the right mid lung and left lower lobe concerning for pneumonia. Transferred to [facility], admitted for further evaluation and treatment.     Past Medical History:   Diagnosis Date    CLL (chronic lymphocytic leukemia)     Diabetes mellitus     Glaucoma     High cholesterol     Hypertension        History reviewed. No pertinent surgical history.    Review of patient's allergies indicates:  No Known Allergies    No current facility-administered medications on file prior to encounter.      Current Outpatient Medications on File Prior to Encounter   Medication Sig    allopurinol (ZYLOPRIM) 300 MG tablet Take 300 mg by " mouth once daily.    atovaquone (MEPRON) 750 mg/5 mL Susp Take by mouth.    docusate calcium (SURFAK) 240 mg capsule Take 240 mg by mouth 2 (two) times daily.    entecavir (BARACLUDE) 0.5 MG Tab Take 0.5 mg by mouth once daily.    amlodipine (NORVASC) 10 MG tablet Take 10 mg by mouth once daily.    aspirin 81 MG Chew Take 81 mg by mouth once daily.    atorvastatin (LIPITOR) 20 MG tablet Take 20 mg by mouth once daily.    dorzolamide-timolol 2-0.5% (COSOPT) 22.3-6.8 mg/mL ophthalmic solution 1 drop 2 (two) times daily.    ibrutinib 140 mg Cap Take 420 mg by mouth once daily.     insulin NPH (NOVOLIN N) 100 unit/mL injection Inject into the skin 2 (two) times daily before meals.    multivitamin-zinc gluconate 5 mg/mL Drop Take by mouth.    potassium chloride (KLOR-CON) 10 MEQ TbSR Take 10 mEq by mouth once daily.     Family History     None        Tobacco Use    Smoking status: Never Smoker    Smokeless tobacco: Never Used   Substance and Sexual Activity    Alcohol use: No    Drug use: No    Sexual activity: Not on file     Review of Systems   Constitutional: Negative for chills and fever.   HENT: Negative for congestion and rhinorrhea.    Eyes: Negative for discharge.   Respiratory: Negative for chest tightness and shortness of breath.    Cardiovascular: Negative for chest pain and palpitations.   Gastrointestinal: Negative for abdominal pain and nausea.   Genitourinary: Negative for dysuria and hematuria.   Musculoskeletal: Positive for myalgias. Negative for arthralgias.   Skin: Positive for color change, rash and wound.        S/P I&D left buttock     Neurological: Positive for weakness. Negative for headaches.   Psychiatric/Behavioral: Negative for decreased concentration and sleep disturbance. The patient is not nervous/anxious.      Objective:     Vital Signs (Most Recent):  Temp: 98.4 °F (36.9 °C) (07/06/19 0940)  Pulse: 84 (07/06/19 1300)  Resp: 19 (07/06/19 1300)  BP: (!) 95/52 (07/06/19  1300)  SpO2: 99 % (07/06/19 1300) Vital Signs (24h Range):  Temp:  [98.4 °F (36.9 °C)] 98.4 °F (36.9 °C)  Pulse:  [] 84  Resp:  [17-19] 19  SpO2:  [99 %] 99 %  BP: ()/(52-65) 95/52     Weight: 70.3 kg (155 lb)  Body mass index is 20.45 kg/m².    Physical Exam   Constitutional: He is oriented to person, place, and time. He appears well-developed and well-nourished.   HENT:   Head: Normocephalic and atraumatic.   Eyes: Pupils are equal, round, and reactive to light. EOM are normal.   Neck: Normal range of motion. Neck supple.   Cardiovascular: Normal rate, regular rhythm and normal heart sounds. Exam reveals no gallop and no friction rub.   No murmur heard.  Pulmonary/Chest: Effort normal and breath sounds normal. No respiratory distress.   Decreased breath sounds both bases    Abdominal: Soft. Bowel sounds are normal. There is no tenderness.   Musculoskeletal: Normal range of motion. He exhibits tenderness. He exhibits no edema.   Neurological: He is alert and oriented to person, place, and time.   Skin: Capillary refill takes less than 2 seconds. There is erythema.   Wound dressing to left buttock clean and dry   Psychiatric: His speech is normal and behavior is normal. Thought content normal. His mood appears not anxious. Cognition and memory are normal. He does not exhibit a depressed mood.         CRANIAL NERVES     CN III, IV, VI   Pupils are equal, round, and reactive to light.  Extraocular motions are normal.        Significant Labs:   CBC:   Recent Labs   Lab 07/06/19  1212   WBC 18.56*   HGB 8.3*   HCT 26.9*   *     CMP:   Recent Labs   Lab 07/06/19  1212      K 4.6      CO2 24      BUN 15   CREATININE 0.72   CALCIUM 8.7   PROT 6.0   ALBUMIN 3.3*   BILITOT 1.6*   ALKPHOS 65   AST 41   ALT 18   ANIONGAP 8   EGFRNONAA >60.0     Lactic Acid:   Recent Labs   Lab 07/06/19  1212   LACTATE 1.4       Recent Lab Results       07/06/19  1212        Albumin 3.3     Alkaline  Phosphatase 65     ALT 18     Anion Gap 8     AST 41     Baso # 0.04     Basophil% 0.2     BILIRUBIN TOTAL 1.6  Comment:  For infants and newborns, interpretation of results should be based  on gestational age, weight and in agreement with clinical  observations.  Premature Infant recommended reference ranges:  Up to 24 hours.............<8.0 mg/dL  Up to 48 hours............<12.0 mg/dL  3-5 days..................<15.0 mg/dL  6-29 days.................<15.0 mg/dL       BUN, Bld 15     Calcium 8.7     Chloride 105     CO2 24     Creatinine 0.72     Differential Method Automated     eGFR if African American >60.0     eGFR if non  >60.0  Comment:  Calculation used to obtain the estimated glomerular filtration  rate (eGFR) is the CKD-EPI equation.        Eos # 0.0     Eosinophil% 0.1     Glucose 101     Gran # (ANC) 6.2     Gran% 33.6     Hematocrit 26.9     Hemoglobin 8.3     Lactate, Humphrey 1.4     Lymph # 11.7     Lymph% 62.9     MCH 31.6     MCHC 30.9          Mono # 0.6     Mono% 3.2     MPV 11.0     Platelets 123     Potassium 4.6     PROTEIN TOTAL 6.0     RBC 2.63     RDW 17.9     Sodium 137     WBC 18.56           Significant Imaging: I have reviewed all pertinent imaging results/findings within the past 24 hours.    Assessment/Plan:     * Pneumonia of both lungs due to infectious organism  CXr reported Infiltrates involving the right mid lung and left lower lobe concerning for pneumonia.  Continue Vanc  Azithromycin/Rocephin      Hyperlipidemia  Continue Lipitor      Essential hypertension  Hold med       CLL (chronic lymphocytic leukemia)  Immunosuppression status  Hold Baraclude and Ibrutinid      Type 2 diabetes mellitus with stage 3 chronic kidney disease, with long-term current use of insulin  Levemir  Low dose SSI  Accucheck  Diabetic diet      Abscess  S/p I and D in the ED  Continue Vanc   FU culture  Consult wound care        VTE Risk Mitigation (From admission, onward)         Ordered     enoxaparin injection 40 mg  Daily      07/06/19 2101     IP VTE HIGH RISK PATIENT  Once      07/06/19 2101             Baylee Corrales, APRN, FNP-C  Department of Hospital Medicine   [facility]

## 2019-07-07 NOTE — PLAN OF CARE
Problem: Adult Inpatient Plan of Care  Goal: Plan of Care Review  Outcome: Ongoing (interventions implemented as appropriate)  Admitted to 404.  Initiated care plan and education. Review orders.  Explained VN role.  Chart review complete.

## 2019-07-07 NOTE — ED NOTES
"Received call from Roni ALBRIGHT "working on getting a unit to you but the trucks in the area are on calls, as soon as they free up, I will send one to you"  "

## 2019-07-08 PROBLEM — L02.31 ABSCESS OF LEFT BUTTOCK: Status: ACTIVE | Noted: 2017-07-03

## 2019-07-08 PROBLEM — E44.1 MILD MALNUTRITION: Status: ACTIVE | Noted: 2019-07-08

## 2019-07-08 LAB
ALBUMIN SERPL BCP-MCNC: 2.7 G/DL (ref 3.5–5.2)
ALP SERPL-CCNC: 78 U/L (ref 55–135)
ALT SERPL W/O P-5'-P-CCNC: 13 U/L (ref 10–44)
ANION GAP SERPL CALC-SCNC: 6 MMOL/L (ref 8–16)
AST SERPL-CCNC: 16 U/L (ref 10–40)
BACTERIA SPEC AEROBE CULT: ABNORMAL
BASOPHILS # BLD AUTO: ABNORMAL K/UL (ref 0–0.2)
BASOPHILS NFR BLD: 0 % (ref 0–1.9)
BILIRUB SERPL-MCNC: 1.2 MG/DL (ref 0.1–1)
BUN SERPL-MCNC: 12 MG/DL (ref 8–23)
CALCIUM SERPL-MCNC: 8.6 MG/DL (ref 8.7–10.5)
CHLORIDE SERPL-SCNC: 106 MMOL/L (ref 95–110)
CO2 SERPL-SCNC: 25 MMOL/L (ref 23–29)
CREAT SERPL-MCNC: 0.9 MG/DL (ref 0.5–1.4)
DIFFERENTIAL METHOD: ABNORMAL
EOSINOPHIL # BLD AUTO: ABNORMAL K/UL (ref 0–0.5)
EOSINOPHIL NFR BLD: 0 % (ref 0–8)
ERYTHROCYTE [DISTWIDTH] IN BLOOD BY AUTOMATED COUNT: 16.9 % (ref 11.5–14.5)
EST. GFR  (AFRICAN AMERICAN): >60 ML/MIN/1.73 M^2
EST. GFR  (NON AFRICAN AMERICAN): >60 ML/MIN/1.73 M^2
GLUCOSE SERPL-MCNC: 93 MG/DL (ref 70–110)
HCT VFR BLD AUTO: 24.7 % (ref 40–54)
HGB BLD-MCNC: 7.7 G/DL (ref 14–18)
LYMPHOCYTES # BLD AUTO: ABNORMAL K/UL (ref 1–4.8)
LYMPHOCYTES NFR BLD: 77 % (ref 18–48)
MAGNESIUM SERPL-MCNC: 1.7 MG/DL (ref 1.6–2.6)
MCH RBC QN AUTO: 31.2 PG (ref 27–31)
MCHC RBC AUTO-ENTMCNC: 31.2 G/DL (ref 32–36)
MCV RBC AUTO: 100 FL (ref 82–98)
MONOCYTES # BLD AUTO: ABNORMAL K/UL (ref 0.3–1)
MONOCYTES NFR BLD: 2 % (ref 4–15)
NEUTROPHILS NFR BLD: 21 % (ref 38–73)
PATH REV BLD -IMP: NORMAL
PLATELET # BLD AUTO: 138 K/UL (ref 150–350)
PMV BLD AUTO: 11 FL (ref 9.2–12.9)
POCT GLUCOSE: 134 MG/DL (ref 70–110)
POCT GLUCOSE: 154 MG/DL (ref 70–110)
POCT GLUCOSE: 69 MG/DL (ref 70–110)
POCT GLUCOSE: 97 MG/DL (ref 70–110)
POTASSIUM SERPL-SCNC: 3.7 MMOL/L (ref 3.5–5.1)
PROT SERPL-MCNC: 4.8 G/DL (ref 6–8.4)
RBC # BLD AUTO: 2.47 M/UL (ref 4.6–6.2)
SODIUM SERPL-SCNC: 137 MMOL/L (ref 136–145)
WBC # BLD AUTO: 17.88 K/UL (ref 3.9–12.7)

## 2019-07-08 PROCEDURE — 85007 BL SMEAR W/DIFF WBC COUNT: CPT

## 2019-07-08 PROCEDURE — 36415 COLL VENOUS BLD VENIPUNCTURE: CPT

## 2019-07-08 PROCEDURE — 63600175 PHARM REV CODE 636 W HCPCS: Performed by: FAMILY MEDICINE

## 2019-07-08 PROCEDURE — 85027 COMPLETE CBC AUTOMATED: CPT

## 2019-07-08 PROCEDURE — 97802 MEDICAL NUTRITION INDIV IN: CPT

## 2019-07-08 PROCEDURE — 80053 COMPREHEN METABOLIC PANEL: CPT

## 2019-07-08 PROCEDURE — 25000003 PHARM REV CODE 250: Performed by: FAMILY MEDICINE

## 2019-07-08 PROCEDURE — 83735 ASSAY OF MAGNESIUM: CPT

## 2019-07-08 PROCEDURE — 11000001 HC ACUTE MED/SURG PRIVATE ROOM

## 2019-07-08 PROCEDURE — 94761 N-INVAS EAR/PLS OXIMETRY MLT: CPT

## 2019-07-08 RX ORDER — ALLOPURINOL 300 MG/1
300 TABLET ORAL DAILY
Status: DISCONTINUED | OUTPATIENT
Start: 2019-07-08 | End: 2019-07-09 | Stop reason: HOSPADM

## 2019-07-08 RX ORDER — ENTECAVIR 0.5 MG/1
0.5 TABLET, FILM COATED ORAL
Status: DISCONTINUED | OUTPATIENT
Start: 2019-07-08 | End: 2019-07-09 | Stop reason: HOSPADM

## 2019-07-08 RX ORDER — NAPROXEN SODIUM 220 MG/1
81 TABLET, FILM COATED ORAL DAILY
Status: DISCONTINUED | OUTPATIENT
Start: 2019-07-08 | End: 2019-07-09 | Stop reason: HOSPADM

## 2019-07-08 RX ORDER — ATOVAQUONE 750 MG/5ML
750 SUSPENSION ORAL DAILY
Status: DISCONTINUED | OUTPATIENT
Start: 2019-07-08 | End: 2019-07-09 | Stop reason: HOSPADM

## 2019-07-08 RX ORDER — VANCOMYCIN HCL IN 5 % DEXTROSE 1G/250ML
1000 PLASTIC BAG, INJECTION (ML) INTRAVENOUS
Status: DISCONTINUED | OUTPATIENT
Start: 2019-07-08 | End: 2019-07-09

## 2019-07-08 RX ADMIN — CEFTRIAXONE 1 G: 1 INJECTION, SOLUTION INTRAVENOUS at 08:07

## 2019-07-08 RX ADMIN — ENTECAVIR 0.5 MG: 0.5 TABLET, FILM COATED ORAL at 04:07

## 2019-07-08 RX ADMIN — DORZOLAMIDE HYDROCHLORIDE AND TIMOLOL MALEATE 1 DROP: 20; 5 SOLUTION/ DROPS OPHTHALMIC at 08:07

## 2019-07-08 RX ADMIN — VANCOMYCIN HYDROCHLORIDE 1000 MG: 1 INJECTION, POWDER, LYOPHILIZED, FOR SOLUTION INTRAVENOUS at 04:07

## 2019-07-08 RX ADMIN — DORZOLAMIDE HYDROCHLORIDE AND TIMOLOL MALEATE 1 DROP: 20; 5 SOLUTION/ DROPS OPHTHALMIC at 09:07

## 2019-07-08 RX ADMIN — ALLOPURINOL 300 MG: 300 TABLET ORAL at 09:07

## 2019-07-08 RX ADMIN — POTASSIUM CHLORIDE 20 MEQ: 20 TABLET, EXTENDED RELEASE ORAL at 09:07

## 2019-07-08 RX ADMIN — ATOVAQUONE 750 MG: 750 SUSPENSION ORAL at 09:07

## 2019-07-08 RX ADMIN — ASPIRIN 81 MG 81 MG: 81 TABLET ORAL at 09:07

## 2019-07-08 RX ADMIN — AZITHROMYCIN MONOHYDRATE 500 MG: 500 INJECTION, POWDER, LYOPHILIZED, FOR SOLUTION INTRAVENOUS at 08:07

## 2019-07-08 RX ADMIN — ENOXAPARIN SODIUM 40 MG: 100 INJECTION SUBCUTANEOUS at 04:07

## 2019-07-08 NOTE — ASSESSMENT & PLAN NOTE
S/p I and D in the ED  Consult wound care  Dress wound with Iodoform strip  Wound culture showed MRSA. Continue Vanc and transition to po Clindamycin or bactrim at discharge

## 2019-07-08 NOTE — PLAN OF CARE
Problem: Adult Inpatient Plan of Care  Goal: Plan of Care Review  Outcome: Ongoing (interventions implemented as appropriate)  Patient remained in bed with bed alarm on, yellow wrist band and nonskid yelllow socks. Bed in lowest position, wheels locked and call light within reach. Plan of care reviewed with patient and spouse. Patient positioned self in bed. Ambulate with assistance. IVs remained intact. Vital signs remained stable. Paced/afib/NSR on telemetry. No pain noted during shift. Will continue to monitor.

## 2019-07-08 NOTE — PROGRESS NOTES
[facility] Medicine  Progress Note    Patient Name: Kevin Mancilla  MRN: [id number]  Patient Class: IP- Inpatient   Admission Date: 7/6/2019  Length of Stay: 2 days  Attending Physician: Shivani Bartlett*  Primary Care Provider: Primary Doctor No        Subjective:     Principal Problem:Pneumonia of both lungs due to infectious organism      HPI:  Kevin Mancilla is a 60 year old male with a past medical history of Chronic lymphocytic leukemia, Diabetes Mellitus, Glaucoma, Hyperlipidemia and Hypertension. He lives in [place], [place]  With his wife. He has no established PCP but [facility] [place], [place]    He presented to [facility] ED 7/6/2019 with complaints of an abscess to his left sacral area that he noticed 3 days ago. Associated symptoms include redness, pain and swelling. He rates the pain 5/10. I&D performed at [facility] ED, Wound culture was done wound was packed with half-inch iodoform. Further ED workup revealed WBC (18.56), Hgb (8.3), Hct (26.9), Albumin (3.3), total bilirubin (1.6), lactate wnl, CXR revealed Infiltrates involving the right mid lung and left lower lobe concerning for pneumonia. Transferred to [facility], admitted for further evaluation and treatment.     Overview/Hospital Course:  Wound culture showed MRSA. Continue Van and transition to po Clindamycin or bactrim at discharge    Interval History: awake and alert, eating, family bedside.   Discussed wound with wound care nurse, will continue with iodoform strip for now.   Monitor H/H  Wound culture showed MRSA. Continue Vanc and transition to po Clindamycin or bactrim at discharge    Review of Systems   Constitutional: Negative for chills and fever.   Respiratory: Negative for shortness of breath.    Cardiovascular: Negative for chest pain and palpitations.   Gastrointestinal: Negative for abdominal pain and nausea.   Musculoskeletal: Positive for myalgias. Negative for  arthralgias.   Skin: Positive for color change. Negative for wound.        S/P I&D left buttock     Objective:     Vital Signs (Most Recent):  Temp: 98 °F (36.7 °C) (07/08/19 1203)  Pulse: 79 (07/08/19 1215)  Resp: 18 (07/08/19 1215)  BP: (!) 103/57 (07/08/19 1203)  SpO2: 100 % (07/08/19 1215) Vital Signs (24h Range):  Temp:  [96.8 °F (36 °C)-99.9 °F (37.7 °C)] 98 °F (36.7 °C)  Pulse:  [77-95] 79  Resp:  [14-20] 18  SpO2:  [96 %-100 %] 100 %  BP: (103-126)/(57-68) 103/57     Weight: 75.3 kg (166 lb 0.1 oz)  Body mass index is 21.9 kg/m².    Intake/Output Summary (Last 24 hours) at 7/8/2019 1457  Last data filed at 7/8/2019 1000  Gross per 24 hour   Intake 540 ml   Output 1175 ml   Net -635 ml      Physical Exam   Constitutional: He is oriented to person, place, and time. He appears well-developed and well-nourished.   HENT:   Head: Normocephalic and atraumatic.   Cardiovascular: Normal rate, regular rhythm and normal heart sounds. Exam reveals no gallop and no friction rub.   No murmur heard.  Pulmonary/Chest: Effort normal and breath sounds normal. No respiratory distress.   Decreased breath sounds both bases    Abdominal: Soft. Bowel sounds are normal. There is no tenderness.   Musculoskeletal: Normal range of motion. He exhibits tenderness. He exhibits no edema.   Neurological: He is alert and oriented to person, place, and time.   Skin: Capillary refill takes less than 2 seconds. There is erythema.   Induration around the wound. Mildly tender.        Significant Labs:   Blood Culture:   No results for input(s): LABBLOO in the last 48 hours.  CBC:   Recent Labs   Lab 07/07/19  0323 07/08/19  0548   WBC 16.63* 17.88*   HGB 7.5* 7.7*   HCT 23.9* 24.7*   * 138*     CMP:   Recent Labs   Lab 07/07/19  0323 07/08/19  0547    137   K 3.3* 3.7    106   CO2 22* 25   GLU 87 93   BUN 12 12   CREATININE 0.8 0.9   CALCIUM 8.5* 8.6*   PROT 4.7* 4.8*   ALBUMIN 2.8* 2.7*   BILITOT 1.6* 1.2*   ALKPHOS 77 78    AST 16 16   ALT 12 13   ANIONGAP 8 6*   EGFRNONAA >60 >60     Magnesium:   Recent Labs   Lab 07/07/19  0323 07/08/19  0547   MG 1.4* 1.7     Prealbumin: No results for input(s): PREALBUMIN in the last 48 hours.  Troponin: No results for input(s): TROPONINI in the last 48 hours.  Urine Culture: No results for input(s): LABURIN in the last 48 hours.  Urine Studies:   Recent Labs   Lab 07/06/19  1541   COLORU Yellow   APPEARANCEUA Clear   PHUR 6.0   SPECGRAV 1.015   PROTEINUA 1+*   GLUCUA Negative   KETONESU Negative   BILIRUBINUA Negative   OCCULTUA Negative   NITRITE Negative   UROBILINOGEN Negative   LEUKOCYTESUR Negative   RBCUA 0   WBCUA 0   BACTERIA None   HYALINECASTS 0       Significant Imaging: I have reviewed all pertinent imaging results/findings within the past 24 hours.      Assessment/Plan:      * Pneumonia of both lungs due to infectious organism  CXr reported Infiltrates involving the right mid lung and left lower lobe concerning for pneumonia.  Continue Vanc  Azithromycin/Rocephin      Hypomagnesemia  Replace       Hypokalemia  Replace       Hyperlipidemia  Continue Lipitor      Essential hypertension  Hold med       CLL (chronic lymphocytic leukemia)  Immunosuppression status  Hold Baraclude and Ibrutinid      Type 2 diabetes mellitus with stage 3 chronic kidney disease, with long-term current use of insulin  Levemir  Low dose SSI  Accucheck  Diabetic diet      Abscess of left buttock  S/p I and D in the ED  Consult wound care  Dress wound with Iodoform strip  Wound culture showed MRSA. Continue Vanc and transition to po Clindamycin or bactrim at discharge      VTE Risk Mitigation (From admission, onward)        Ordered     enoxaparin injection 40 mg  Daily      07/06/19 2101     IP VTE HIGH RISK PATIENT  Once      07/06/19 2101                Shivani Bartlett MD  Department of Hospital Medicine   [facility]

## 2019-07-08 NOTE — PLAN OF CARE
Visit with pt for d/c planning, wife at bedside.  Pt lives in a home with his wife, states he uses RW to ambulate in the home but wife states lately pt has been requiring assistance with ADL's.  She has been able to assist whenever and will be available at time of d/c.  We did discuss home health for additional needs at home and both are open to it.  They have agreed to use Medicare for HH needs as [facility].      [facility] [place] and states he sees his P[facility] regularly.  Will schedule f/u appt post d/c.  Plan to remain hospitalized 1-2 more days pending progression of abscess.  Pt and wife are in agreement.    Discharge planning brochure provided. White board updated with CM name & contact info.  Pt encouraged to call with any questions or needs. CM will continue to follow patient throughout the transitions of care, and assist with any discharge needs.         07/08/19 1435   Discharge Assessment   Assessment Type Discharge Planning Assessment   Confirmed/corrected address and phone number on facesheet? Yes   Assessment information obtained from? Patient   Expected Length of Stay (days) 2   Communicated expected length of stay with patient/caregiver yes   Prior to hospitilization cognitive status: Alert/Oriented   Prior to hospitalization functional status: Assistive Equipment;Independent   Current cognitive status: Alert/Oriented   Current Functional Status: Independent;Assistive Equipment   Lives With spouse   Able to Return to Prior Arrangements yes   Is patient able to care for self after discharge? Yes   Who are your caregiver(s) and their phone number(s)?   (Nai Galan [phone])   Readmission Within the Last 30 Days no previous admission in last 30 days   Patient currently being followed by outpatient case management? No   Patient currently receives any other outside agency services? No   Equipment Currently Used at Home  walker, rolling;bedside commode   Do you have any problems affording any of your prescribed medications? No   Is the patient taking medications as prescribed? yes   Does the patient have transportation home? Yes   Transportation Anticipated family or friend will provide   Does the patient receive services at the [facility]? No   Discharge Plan A Home with family;Home Health   Discharge Plan B Home with family   DME Needed Upon Discharge  none   Patient/Family in Agreement with Plan yes   Does the patient have transportation to healthcare appointments? Yes

## 2019-07-08 NOTE — HOSPITAL COURSE
The wound culture grew methicillin-resistant Staphylococcus aureus. He was kept several days to continue IV vancomycin. He was additionally given ceftriaxone and azithromycin for the pneumonia. On 7/9/19, General Surgery was consulted because the abscess appeared to need further drainage. The surgeon expressed more pus and repacked the wound. He was discharged home with prescriptions for trimethoprim-sulfamethoxazole, cefdinir, and home health for wound care.

## 2019-07-08 NOTE — PLAN OF CARE
Problem: Adult Inpatient Plan of Care  Goal: Plan of Care Review  Outcome: Ongoing (interventions implemented as appropriate)  Recommendation:   1. Encourage intake at meals as tolerated.   2. Add beneprotein bid & Boost Glucose Control bid.    Goals:   Pt will consume at least 75% intake at meals  Nutrition Goal Status: new

## 2019-07-08 NOTE — CONSULTS
Wound consult for left buttock abscess.    Notified Dr. Live of assessment, Dr. Live aware that wound with 3.5 cm archie wound induration. Modified existing wound care orders to daily iodoform from every other day and for increased monitoring of site.     Placed warm-pack on wound, pt turned slightly to right side.

## 2019-07-08 NOTE — ASSESSMENT & PLAN NOTE
Malnutrition in the context of Chronic Illness/Injury    Related to (etiology):  leukemia    Signs and Symptoms (as evidenced by):  Energy Intake: WNL  Body Fat Depletion: mild depletion of thoracic and lumbar region   Muscle Mass Depletion: mild depletion of temples and clavicle region   Weight Loss: 23% x 5 months    Interventions:  Commercial beverage    Nutrition Diagnosis Status:  New

## 2019-07-08 NOTE — PROGRESS NOTES
Wound culture from L buttock abscess positive for methicillin resistant staph aureus. Dr. Live notified.

## 2019-07-08 NOTE — CONSULTS
"  [facility]  Adult Nutrition  Consult Note    SUMMARY     Recommendations    Recommendation:   1. Encourage intake at meals as tolerated.   2. Add beneprotein bid & Boost Glucose Control bid.    Goals:   Pt will consume at least 75% intake at meals  Nutrition Goal Status: new    Reason for Assessment  Reason For Assessment: consult(weight loss)  Diagnosis: (pneumonia)  Relevant Medical History: HTN, high cholesterol, DM, chronic lymphocytic leukemia  General Information Comments: Pt on  ADA diet. Pt reports fair-good intake at meals. Reports unintentional weight loss of 50#. States has been eatign normal. Probably 2/2 leukemia. NFPE completed today -mild wasting of clavicle, temple and thoracic regions.  Nutrition Discharge Planning: pt to d/c on ADA diet    Nutrition Risk Screen  Nutrition Risk Screen: other (see comments)(WEIGHT LOSS OF 50 POUNDS SINCE MARCH)    Nutrition/Diet History  Food Preferences: no Methodist or cultural food prefs identified  Spiritual, Cultural Beliefs, Rastafari Practices, Values that Affect Care: no    Anthropometrics  Temp: 98 °F (36.7 °C)  Height Method: Stated  Height: 6' 1" (185.4 cm)  Height (inches): 73 in  Weight Method: Bed Scale  Weight: 75.3 kg (166 lb 0.1 oz)  Weight (lb): 166.01 lb  Ideal Body Weight (IBW), Male: 184 lb  % Ideal Body Weight, Male (lb): 90.22 lb  BMI (Calculated): 21.9  Usual Body Weight (UBW), k.1 kg  % Usual Body Weight: 76.92  % Weight Change From Usual Weight: -23.24 %     Lab/Procedures/Meds  Pertinent Labs Reviewed: reviewed  Pertinent Labs Comments: Ca 8.6L, Alb 2.7L  Pertinent Medications Reviewed: reviewed  Pertinent Medications Comments: aspirin, rocephin, insulin    Estimated/Assessed Needs  Weight Used For Calorie Calculations: 75.3 kg (166 lb 0.1 oz)  Energy Calorie Requirements (kcal): 1495-3171 (30-35 kcal/kg)  Energy Need Method: Kcal/kg  Protein Requirements: 75-90g (1.0-1.2g/kg)  Weight Used For Protein " Calculations: 75.3 kg (166 lb 0.1 oz)  Estimated Fluid Requirement Method: RDA Method  RDA Method (mL): 2259  CHO Requirement: 250g    Nutrition Prescription Ordered  Current Diet Order: 2000 ADA    Evaluation of Received Nutrient/Fluid Intake  I/O: 305/1250  Energy Calories Required: meeting needs  Protein Required: meeting needs  Fluid Required: meeting needs  Comments: LBM 7/5  % Intake of Estimated Energy Needs: 50 - 75 %  % Meal Intake: 50 - 75 %    Nutrition Risk  Level of Risk/Frequency of Follow-up: (2xweekly)     Assessment and Plan    Mild malnutrition  Malnutrition in the context of Chronic Illness/Injury    Related to (etiology):  leukemia    Signs and Symptoms (as evidenced by):  Energy Intake: WNL  Body Fat Depletion: mild depletion of thoracic and lumbar region   Muscle Mass Depletion: mild depletion of temples and clavicle region   Weight Loss: 23% x 5 months    Interventions:  Commercial beverage    Nutrition Diagnosis Status:  New       Monitor and Evaluation  Food and Nutrient Intake: food and beverage intake  Food and Nutrient Adminstration: diet order  Physical Activity and Function: nutrition-related ADLs and IADLs  Anthropometric Measurements: weight  Biochemical Data, Medical Tests and Procedures: electrolyte and renal panel  Nutrition-Focused Physical Findings: overall appearance     Malnutrition Assessment    Weight Loss (Malnutrition): greater than 7.5% in 3 months   Thoracic and Lumbar Region: mild depletion   Restorationism Region (Muscle Loss): mild depletion  Clavicle Bone Region (Muscle Loss): mild depletion   Subcutaneous Fat Loss (Final Summary): mild protein-calorie malnutrition  Muscle Loss Evaluation (Final Summary): mild protein-calorie malnutrition    Severe Weight Loss (Malnutrition): greater than 7.5% in 3 months    Nutrition Follow-Up  RD Follow-up?: Yes

## 2019-07-08 NOTE — PROGRESS NOTES
Pharmacokinetic Initial Assessment: IV Vancomycin    Assessment/Plan:        Initiate intravenous vancomycin with loading dose of 2000 mg once followed by a maintenance dose of vancomycin 1000mg IV every 12 hours; patient received 1000mg x1 loading dose  Desired empiric serum trough concentration is 10 to 20 mcg/mL.  Draw vancomycin trough level 30 min prior to fourth dose on 7/10 at approximately 0300   Pharmacy will continue to follow and monitor vancomycin.      Please contact pharmacy at extension [phone] with any questions regarding this assessment.     Thank you for the consult,   Frank Tran     Patient brief summary:  Kevin Mancilla is a 64 y.o. male initiated on antimicrobial therapy with IV Vancomycin for treatment of suspected skin & soft tissue    Drug Allergies:   Review of patient's allergies indicates:  No Known Allergies    Actual Body Weight:   75.3KG    Renal Function:   Estimated Creatinine Clearance: 88.3 mL/min (based on SCr of 0.9 mg/dL).,     Dialysis Method (if applicable):        CBC (last 72 hours):  Recent Labs   Lab Result Units 07/06/19  1212 07/06/19  2139 07/07/19  0323 07/08/19  0548   WBC K/uL 18.56*  --  16.63* 17.88*   Hemoglobin g/dL 8.3*  --  7.5* 7.7*   Hemoglobin A1C %  --  4.4  --   --    Hematocrit % 26.9*  --  23.9* 24.7*   Platelets K/uL 123*  --  127* 138*   Gran% % 33.6*  --  29.8* 21.0*   Lymph% % 62.9*  --  66.4* 77.0*   Mono% % 3.2*  --  3.6* 2.0*   Eosinophil% % 0.1  --  0.1 0.0   Basophil% % 0.2  --  0.1 0.0   Differential Method  Automated  --  Automated Manual       Metabolic Panel (last 72 hours):  Recent Labs   Lab Result Units 07/06/19  1212 07/06/19  1541 07/07/19  0323 07/08/19  0547   Sodium mmol/L 137  --  137 137   Potassium mmol/L 4.6  --  3.3* 3.7   Chloride mmol/L 105  --  107 106   CO2 mmol/L 24  --  22* 25   Glucose mg/dL 101  --  87 93   Glucose, UA   --  Negative  --   --    BUN, Bld mg/dL 15  --  12 12   Creatinine mg/dL 0.72  --  0.8 0.9    Albumin g/dL 3.3*  --  2.8* 2.7*   Total Bilirubin mg/dL 1.6*  --  1.6* 1.2*   Alkaline Phosphatase U/L 65  --  77 78   AST U/L 41  --  16 16   ALT U/L 18  --  12 13   Magnesium mg/dL  --   --  1.4* 1.7       Drug levels (last 3 results):  No results for input(s): VANCOMYCINRA, VANCOMYCINPE, VANCOMYCINTR in the last 72 hours.    Microbiologic Results:  Microbiology Results (last 7 days)       Procedure Component Value Units Date/Time    Aerobic culture [[id number]]  (Abnormal)  (Susceptibility) Collected:  07/06/19 1248    Order Status:  Completed Specimen:  Abscess from Buttocks, Left Updated:  07/08/19 1238     Aerobic Bacterial Culture METHICILLIN RESISTANT STAPHYLOCOCCUS AUREUS  Moderate      Blood culture [[id number]] Collected:  07/06/19 1212    Order Status:  Completed Specimen:  Blood from Peripheral, Hand, Right Updated:  07/07/19 2212     Blood Culture, Routine No Growth to date      No Growth to date    Blood culture [[id number]]     Order Status:  Canceled Specimen:  Blood

## 2019-07-08 NOTE — PLAN OF CARE
Problem: Adult Inpatient Plan of Care  Goal: Plan of Care Review  Outcome: Ongoing (interventions implemented as appropriate)  Plan of care reviewed with patient and spouse. Voiced understanding. NSR on monitor with no red alarms noted. Woundcare complete per orders. Patient placed on contact isolation.No acute distress noted at this time. Side rails x3, bed low, call bell within reach. Maintain bed alarm for patient safety. Patient will be monitored overnight.

## 2019-07-08 NOTE — SUBJECTIVE & OBJECTIVE
Interval History: awake and alert, eating, family bedside.   Discussed wound with wound care nurse, will continue with iodoform strip for now.   Monitor H/H  Wound culture showed MRSA. Continue Vanc and transition to po Clindamycin or bactrim at discharge    Review of Systems   Constitutional: Negative for chills and fever.   Respiratory: Negative for shortness of breath.    Cardiovascular: Negative for chest pain and palpitations.   Gastrointestinal: Negative for abdominal pain and nausea.   Musculoskeletal: Positive for myalgias. Negative for arthralgias.   Skin: Positive for color change. Negative for wound.        S/P I&D left buttock     Objective:     Vital Signs (Most Recent):  Temp: 98 °F (36.7 °C) (07/08/19 1203)  Pulse: 79 (07/08/19 1215)  Resp: 18 (07/08/19 1215)  BP: (!) 103/57 (07/08/19 1203)  SpO2: 100 % (07/08/19 1215) Vital Signs (24h Range):  Temp:  [96.8 °F (36 °C)-99.9 °F (37.7 °C)] 98 °F (36.7 °C)  Pulse:  [77-95] 79  Resp:  [14-20] 18  SpO2:  [96 %-100 %] 100 %  BP: (103-126)/(57-68) 103/57     Weight: 75.3 kg (166 lb 0.1 oz)  Body mass index is 21.9 kg/m².    Intake/Output Summary (Last 24 hours) at 7/8/2019 1457  Last data filed at 7/8/2019 1000  Gross per 24 hour   Intake 540 ml   Output 1175 ml   Net -635 ml      Physical Exam   Constitutional: He is oriented to person, place, and time. He appears well-developed and well-nourished.   HENT:   Head: Normocephalic and atraumatic.   Cardiovascular: Normal rate, regular rhythm and normal heart sounds. Exam reveals no gallop and no friction rub.   No murmur heard.  Pulmonary/Chest: Effort normal and breath sounds normal. No respiratory distress.   Decreased breath sounds both bases    Abdominal: Soft. Bowel sounds are normal. There is no tenderness.   Musculoskeletal: Normal range of motion. He exhibits tenderness. He exhibits no edema.   Neurological: He is alert and oriented to person, place, and time.   Skin: Capillary refill takes less than 2  seconds. There is erythema.   Induration around the wound. Mildly tender.        Significant Labs:   Blood Culture:   No results for input(s): LABBLOO in the last 48 hours.  CBC:   Recent Labs   Lab 07/07/19  0323 07/08/19  0548   WBC 16.63* 17.88*   HGB 7.5* 7.7*   HCT 23.9* 24.7*   * 138*     CMP:   Recent Labs   Lab 07/07/19 0323 07/08/19  0547    137   K 3.3* 3.7    106   CO2 22* 25   GLU 87 93   BUN 12 12   CREATININE 0.8 0.9   CALCIUM 8.5* 8.6*   PROT 4.7* 4.8*   ALBUMIN 2.8* 2.7*   BILITOT 1.6* 1.2*   ALKPHOS 77 78   AST 16 16   ALT 12 13   ANIONGAP 8 6*   EGFRNONAA >60 >60     Magnesium:   Recent Labs   Lab 07/07/19 0323 07/08/19  0547   MG 1.4* 1.7     Prealbumin: No results for input(s): PREALBUMIN in the last 48 hours.  Troponin: No results for input(s): TROPONINI in the last 48 hours.  Urine Culture: No results for input(s): LABURIN in the last 48 hours.  Urine Studies:   Recent Labs   Lab 07/06/19  1541   COLORU Yellow   APPEARANCEUA Clear   PHUR 6.0   SPECGRAV 1.015   PROTEINUA 1+*   GLUCUA Negative   KETONESU Negative   BILIRUBINUA Negative   OCCULTUA Negative   NITRITE Negative   UROBILINOGEN Negative   LEUKOCYTESUR Negative   RBCUA 0   WBCUA 0   BACTERIA None   HYALINECASTS 0       Significant Imaging: I have reviewed all pertinent imaging results/findings within the past 24 hours.

## 2019-07-09 VITALS
DIASTOLIC BLOOD PRESSURE: 66 MMHG | RESPIRATION RATE: 16 BRPM | WEIGHT: 167.31 LBS | BODY MASS INDEX: 22.17 KG/M2 | TEMPERATURE: 97 F | OXYGEN SATURATION: 99 % | HEART RATE: 62 BPM | SYSTOLIC BLOOD PRESSURE: 112 MMHG | HEIGHT: 73 IN

## 2019-07-09 PROBLEM — L03.313 CELLULITIS OF CHEST WALL: Status: RESOLVED | Noted: 2017-07-03 | Resolved: 2019-07-09

## 2019-07-09 PROBLEM — E87.6 HYPOKALEMIA: Chronic | Status: ACTIVE | Noted: 2019-07-07

## 2019-07-09 PROBLEM — L02.213 ABSCESS OF CHEST WALL: Status: RESOLVED | Noted: 2017-07-04 | Resolved: 2019-07-09

## 2019-07-09 LAB
ALBUMIN SERPL BCP-MCNC: 2.5 G/DL (ref 3.5–5.2)
ALP SERPL-CCNC: 76 U/L (ref 55–135)
ALT SERPL W/O P-5'-P-CCNC: 11 U/L (ref 10–44)
ANION GAP SERPL CALC-SCNC: 6 MMOL/L (ref 8–16)
ANISOCYTOSIS BLD QL SMEAR: SLIGHT
AST SERPL-CCNC: 12 U/L (ref 10–40)
BASOPHILS # BLD AUTO: ABNORMAL K/UL (ref 0–0.2)
BASOPHILS NFR BLD: 0 % (ref 0–1.9)
BILIRUB SERPL-MCNC: 0.7 MG/DL (ref 0.1–1)
BUN SERPL-MCNC: 12 MG/DL (ref 8–23)
CALCIUM SERPL-MCNC: 8.5 MG/DL (ref 8.7–10.5)
CHLORIDE SERPL-SCNC: 108 MMOL/L (ref 95–110)
CO2 SERPL-SCNC: 24 MMOL/L (ref 23–29)
CREAT SERPL-MCNC: 0.9 MG/DL (ref 0.5–1.4)
DIFFERENTIAL METHOD: ABNORMAL
EOSINOPHIL # BLD AUTO: ABNORMAL K/UL (ref 0–0.5)
EOSINOPHIL NFR BLD: 1 % (ref 0–8)
ERYTHROCYTE [DISTWIDTH] IN BLOOD BY AUTOMATED COUNT: 16.8 % (ref 11.5–14.5)
EST. GFR  (AFRICAN AMERICAN): >60 ML/MIN/1.73 M^2
EST. GFR  (NON AFRICAN AMERICAN): >60 ML/MIN/1.73 M^2
GLUCOSE SERPL-MCNC: 172 MG/DL (ref 70–110)
HCT VFR BLD AUTO: 24.4 % (ref 40–54)
HGB BLD-MCNC: 7.5 G/DL (ref 14–18)
LYMPHOCYTES # BLD AUTO: ABNORMAL K/UL (ref 1–4.8)
LYMPHOCYTES NFR BLD: 75 % (ref 18–48)
MAGNESIUM SERPL-MCNC: 1.4 MG/DL (ref 1.6–2.6)
MCH RBC QN AUTO: 31 PG (ref 27–31)
MCHC RBC AUTO-ENTMCNC: 30.7 G/DL (ref 32–36)
MCV RBC AUTO: 101 FL (ref 82–98)
MONOCYTES # BLD AUTO: ABNORMAL K/UL (ref 0.3–1)
MONOCYTES NFR BLD: 1 % (ref 4–15)
NEUTROPHILS NFR BLD: 23 % (ref 38–73)
PLATELET # BLD AUTO: 141 K/UL (ref 150–350)
PLATELET BLD QL SMEAR: ABNORMAL
PMV BLD AUTO: 10.6 FL (ref 9.2–12.9)
POCT GLUCOSE: 96 MG/DL (ref 70–110)
POCT GLUCOSE: 99 MG/DL (ref 70–110)
POTASSIUM SERPL-SCNC: 3.7 MMOL/L (ref 3.5–5.1)
PROT SERPL-MCNC: 4.5 G/DL (ref 6–8.4)
RBC # BLD AUTO: 2.42 M/UL (ref 4.6–6.2)
SODIUM SERPL-SCNC: 138 MMOL/L (ref 136–145)
WBC # BLD AUTO: 13.62 K/UL (ref 3.9–12.7)

## 2019-07-09 PROCEDURE — 85027 COMPLETE CBC AUTOMATED: CPT

## 2019-07-09 PROCEDURE — 63600175 PHARM REV CODE 636 W HCPCS: Performed by: FAMILY MEDICINE

## 2019-07-09 PROCEDURE — 83735 ASSAY OF MAGNESIUM: CPT

## 2019-07-09 PROCEDURE — 99222 1ST HOSP IP/OBS MODERATE 55: CPT | Mod: 25,,, | Performed by: STUDENT IN AN ORGANIZED HEALTH CARE EDUCATION/TRAINING PROGRAM

## 2019-07-09 PROCEDURE — 80053 COMPREHEN METABOLIC PANEL: CPT

## 2019-07-09 PROCEDURE — 63600175 PHARM REV CODE 636 W HCPCS: Performed by: HOSPITALIST

## 2019-07-09 PROCEDURE — 25000003 PHARM REV CODE 250: Performed by: FAMILY MEDICINE

## 2019-07-09 PROCEDURE — 99222 PR INITIAL HOSPITAL CARE,LEVL II: ICD-10-PCS | Mod: 25,,, | Performed by: STUDENT IN AN ORGANIZED HEALTH CARE EDUCATION/TRAINING PROGRAM

## 2019-07-09 PROCEDURE — 94761 N-INVAS EAR/PLS OXIMETRY MLT: CPT

## 2019-07-09 PROCEDURE — 25000003 PHARM REV CODE 250: Performed by: HOSPITALIST

## 2019-07-09 PROCEDURE — 10060 I&D ABSCESS SIMPLE/SINGLE: CPT | Mod: ,,, | Performed by: STUDENT IN AN ORGANIZED HEALTH CARE EDUCATION/TRAINING PROGRAM

## 2019-07-09 PROCEDURE — 85007 BL SMEAR W/DIFF WBC COUNT: CPT

## 2019-07-09 PROCEDURE — 36415 COLL VENOUS BLD VENIPUNCTURE: CPT

## 2019-07-09 PROCEDURE — 10060 PR DRAIN SKIN ABSCESS SIMPLE: ICD-10-PCS | Mod: ,,, | Performed by: STUDENT IN AN ORGANIZED HEALTH CARE EDUCATION/TRAINING PROGRAM

## 2019-07-09 RX ORDER — LIDOCAINE HYDROCHLORIDE AND EPINEPHRINE 10; 10 MG/ML; UG/ML
10 INJECTION, SOLUTION INFILTRATION; PERINEURAL ONCE
Status: DISCONTINUED | OUTPATIENT
Start: 2019-07-09 | End: 2019-07-09 | Stop reason: HOSPADM

## 2019-07-09 RX ORDER — SULFAMETHOXAZOLE AND TRIMETHOPRIM 800; 160 MG/1; MG/1
1 TABLET ORAL 2 TIMES DAILY
Qty: 10 TABLET | Refills: 0 | Status: SHIPPED | OUTPATIENT
Start: 2019-07-09 | End: 2019-07-09

## 2019-07-09 RX ORDER — CEFDINIR 300 MG/1
300 CAPSULE ORAL 2 TIMES DAILY
Qty: 10 CAPSULE | Refills: 0 | Status: SHIPPED | OUTPATIENT
Start: 2019-07-09 | End: 2019-07-14

## 2019-07-09 RX ORDER — DOXYCYCLINE HYCLATE 100 MG
100 TABLET ORAL EVERY 12 HOURS
Status: DISCONTINUED | OUTPATIENT
Start: 2019-07-09 | End: 2019-07-09

## 2019-07-09 RX ORDER — SULFAMETHOXAZOLE AND TRIMETHOPRIM 800; 160 MG/1; MG/1
1 TABLET ORAL 2 TIMES DAILY
Status: DISCONTINUED | OUTPATIENT
Start: 2019-07-09 | End: 2019-07-09 | Stop reason: HOSPADM

## 2019-07-09 RX ORDER — SULFAMETHOXAZOLE AND TRIMETHOPRIM 800; 160 MG/1; MG/1
1 TABLET ORAL 2 TIMES DAILY
Qty: 10 TABLET | Refills: 0 | Status: SHIPPED | OUTPATIENT
Start: 2019-07-09 | End: 2019-07-14

## 2019-07-09 RX ORDER — CEFDINIR 300 MG/1
300 CAPSULE ORAL 2 TIMES DAILY
Qty: 10 CAPSULE | Refills: 0 | Status: SHIPPED | OUTPATIENT
Start: 2019-07-09 | End: 2019-07-09 | Stop reason: SDUPTHER

## 2019-07-09 RX ORDER — MAGNESIUM SULFATE HEPTAHYDRATE 40 MG/ML
2 INJECTION, SOLUTION INTRAVENOUS ONCE
Status: COMPLETED | OUTPATIENT
Start: 2019-07-09 | End: 2019-07-09

## 2019-07-09 RX ADMIN — VANCOMYCIN HYDROCHLORIDE 1000 MG: 1 INJECTION, POWDER, LYOPHILIZED, FOR SOLUTION INTRAVENOUS at 03:07

## 2019-07-09 RX ADMIN — MAGNESIUM SULFATE IN WATER 2 G: 40 INJECTION, SOLUTION INTRAVENOUS at 09:07

## 2019-07-09 RX ADMIN — ASPIRIN 81 MG 81 MG: 81 TABLET ORAL at 09:07

## 2019-07-09 RX ADMIN — POTASSIUM CHLORIDE 20 MEQ: 20 TABLET, EXTENDED RELEASE ORAL at 09:07

## 2019-07-09 RX ADMIN — SULFAMETHOXAZOLE AND TRIMETHOPRIM 1 TABLET: 800; 160 TABLET ORAL at 09:07

## 2019-07-09 RX ADMIN — ALLOPURINOL 300 MG: 300 TABLET ORAL at 09:07

## 2019-07-09 RX ADMIN — DORZOLAMIDE HYDROCHLORIDE AND TIMOLOL MALEATE 1 DROP: 20; 5 SOLUTION/ DROPS OPHTHALMIC at 10:07

## 2019-07-09 RX ADMIN — ATOVAQUONE 750 MG: 750 SUSPENSION ORAL at 09:07

## 2019-07-09 NOTE — DISCHARGE INSTRUCTIONS
X-Ray Chest AP Portable 7/06/19: FINDINGS:  Left lower lobe patchy infiltrate.  Early infiltrate in the right upper lobe.  No effusion or pneumothorax.  Cardiac silhouette mediastinum unremarkable.  Tortuous thoracic aorta.  Aerobic culture, abscess from left buttock 7/06/19:   Methicillin resistant staphylococcus aureus     CULTURE, AEROBIC  (SPECIFY SOURCE)     Clindamycin <=0.5 mcg/mL Sensitive     Erythromycin >4 mcg/mL Resistant     Oxacillin >2 mcg/mL Resistant     Penicillin >8 mcg/mL Resistant     Tetracycline <=4 mcg/mL Sensitive     Trimeth/Sulfa <=0.5/9.5 m... Sensitive     Vancomycin 1 mcg/mL Sensitive        Adult, Pneumonia (English) View Edit Remove   [facility] IP AVS PNEUMONIA DISCHARGE INSTRUCTIONS View Edit Remove   Wound Infection, Recognizing and Treating (English) View Edit Remove   Abscess, Incision And Drainage (English) View Edit Remove   Diabetes, Diet (English) View Edit Remove   Cefdinir capsules (English) View Edit Remove   Sulfamethoxazole; Trimethoprim, SMX-TMP tablets (English) View Edit Remove

## 2019-07-09 NOTE — PLAN OF CARE
D/C rounds complete. All questions answered.  Nurse to discuss d/c medications.  Discussed need to keep f/u appts, adherence to medication regimen for health maintenance, verbalized understanding.     07/09/19 1503   Final Note   Assessment Type Final Discharge Note   Anticipated Discharge Disposition Home-Health  ([facility] Home [facility])   Hospital Follow Up  Appt(s) scheduled? Yes   Discharge plans and expectations educations in teach back method with documentation complete? Yes   Right Care Referral Info   Post Acute Recommendation Home-care       Future Appointments   Date Time Provider Department Center   7/12/2019  2:40 PM Misha Muse MD [facility] [place] Do[place], [place]    [phone]

## 2019-07-09 NOTE — PLAN OF CARE
CM visited with spouse and pt, aware of plan for d/c home today, both in agreement with plan.  Pt will return home with wife, will add home health, no preferences.  [facility] can [facility] pt's area and will admit on tomorrow.  Wife and pt will be instructed on wound care by floor nurse prior to d/c with f/u with Dr Muse on Friday in clinic.  Aware of importance of keeping f/u appt to evaluate wound.  [facility] no appts available but [place] at clinic will contact wife with f/u appt once cleared with Dr Lees.  No DME needs at this time.      Gabriela Dooley, RN    [phone]

## 2019-07-09 NOTE — PROGRESS NOTES
Therapy with VANCOMYCIN complete and/or consult discontinued by provider.  Pharmacy will sign off, please re-consult as needed.

## 2019-07-09 NOTE — PLAN OF CARE
Problem: Adult Inpatient Plan of Care  Goal: Plan of Care Review  Outcome: Ongoing (interventions implemented as appropriate)  Patient remained in bed with bed alarm on, yellow wrist band and nonskid yelllow socks. Bed in lowest position, wheels locked and call light within reach. Plan of care reviewed with patient and spouse. Patient positioned self in bed. Ambulate with assistance. IVs remained intact. IV ABX given. Vital signs remained stable. NSR on telemetry. No pain noted during shift. Will continue to monitor.

## 2019-07-09 NOTE — CONSULTS
"Patient ID: Kevin Mancilla is a 64 y.o. male.    Chief Complaint: wound to skin ("I got a wound to my butt like a boil since last Wednesday 7/3/19 and I am a diabetic' )      HPI:  He states he has had abscess in this left buttocks for two weeks. He went to ED on 7/6 and it was drained. It has been packed with iodoform gauze. General surgery consulted today to evaluate wound, and determine if additional drainage is indicated.    There is induration and a second apparently necrotic area anterior and medial to the area which was drained in the ED. He is not particularly tender here. WBC has been trending down. He has been afebrile. He has been on vancomycin because the initial culture was MRSA, but has been switched to bactrim today.    He is also being treated for bilateral pneumonia. He has history of DM and CLL.      Review of Systems   Constitutional: Negative.  Negative for fatigue and fever.   HENT: Negative.    Eyes: Negative.    Respiratory:        Current pneumonia     Cardiovascular: Negative.    Gastrointestinal: Negative.    Endocrine: Negative.    Genitourinary: Negative.    Musculoskeletal: Negative.    Allergic/Immunologic: Negative.    Neurological: Negative.    Psychiatric/Behavioral: Negative.    All other systems reviewed and are negative.      Current Facility-Administered Medications   Medication Dose Route Frequency Provider Last Rate Last Dose    acetaminophen tablet 650 mg  650 mg Oral Q6H PRN Baylee Corrales, NP   650 mg at 07/07/19 0004    allopurinol tablet 300 mg  300 mg Oral Daily Shivani Bartlett MD   300 mg at 07/09/19 0958    aspirin chewable tablet 81 mg  81 mg Oral Daily Shivani Bartlett MD   81 mg at 07/09/19 0958    atovaquone suspension 750 mg  750 mg Oral Daily Shivani YESENIA Bartlett MD   750 mg at 07/09/19 0959    dextrose 10% (D10W) Bolus  12.5 g Intravenous PRN Shivani YESENIA Bartlett MD        dextrose 10% (D10W) Bolus  25 g Intravenous PRN Shivani N. " MD Tri        dorzolamide-timolol 2-0.5% ophthalmic solution 1 drop  1 drop Both Eyes BID Shivani Bartlett MD   1 drop at 07/09/19 1000    enoxaparin injection 40 mg  40 mg Subcutaneous Daily Shivani Bartlett MD   40 mg at 07/08/19 1604    entecavir tablet 0.5 mg  0.5 mg Oral Q24H Shivani Bartlett MD   0.5 mg at 07/08/19 1605    glucagon (human recombinant) injection 1 mg  1 mg Intramuscular PRN Shivani Barltett MD        glucose chewable tablet 16 g  16 g Oral PRN Shivani Bartlett MD        glucose chewable tablet 24 g  24 g Oral PRN Shivani Bartlett MD        HYDROcodone-acetaminophen 5-325 mg per tablet 1 tablet  1 tablet Oral Q6H PRN Shivani Bartlett MD        ibrutinib Tab 420 mg  420 mg Oral Daily Shivani Bartlett MD   420 mg at 07/08/19 1605    insulin aspart U-100 pen 0-5 Units  0-5 Units Subcutaneous QID (AC + HS) PRN Shivani Bartlett MD        insulin detemir U-100 pen 10 Units  10 Units Subcutaneous QHS Shivani Bartlett MD        lidocaine-EPINEPHrine 1%-1:100,000 injection 10 mL  10 mL Intradermal Once RUBIN Castorena MD        magnesium sulfate 2g in water 50mL IVPB (premix)  2 g Intravenous Once Luis M Bullock MD   2 g at 07/09/19 0958    ondansetron disintegrating tablet 8 mg  8 mg Oral Q8H PRN Shivani Bartlett MD        potassium chloride SA CR tablet 20 mEq  20 mEq Oral Daily Shivani Bartlett MD   20 mEq at 07/09/19 0958    ramelteon tablet 8 mg  8 mg Oral Nightly PRN Shivani Bartlett MD        sodium chloride 0.9% flush 10 mL  10 mL Intravenous PRN Shivani Bartlett MD        sulfamethoxazole-trimethoprim 800-160mg per tablet 1 tablet  1 tablet Oral BID Luis M Bullock MD   1 tablet at 07/09/19 0924       Review of patient's allergies indicates:  No Known Allergies    Past Medical History:   Diagnosis Date    Chronic kidney disease, stage 3      CLL (chronic lymphocytic leukemia)     Diabetes mellitus, type 2     Encounter for blood transfusion     Glaucoma     High cholesterol     Hypertension        History reviewed. No pertinent surgical history.    Family History   Problem Relation Age of Onset    No Known Problems Mother     No Known Problems Father        Social History     Socioeconomic History    Marital status:      Spouse name: Not on file    Number of children: Not on file    Years of education: Not on file    Highest education level: Not on file   Occupational History    Not on file   Social Needs    Financial resource strain: Not on file    Food insecurity:     Worry: Not on file     Inability: Not on file    Transportation needs:     Medical: Not on file     Non-medical: Not on file   Tobacco Use    Smoking status: Former Smoker    Smokeless tobacco: Never Used   Substance and Sexual Activity    Alcohol use: No    Drug use: No    Sexual activity: Not on file   Lifestyle    Physical activity:     Days per week: Not on file     Minutes per session: Not on file    Stress: Not on file   Relationships    Social connections:     Talks on phone: Not on file     Gets together: Not on file     Attends Muslim service: Not on file     Active member of club or organization: Not on file     Attends meetings of clubs or organizations: Not on file     Relationship status: Not on file   Other Topics Concern    Not on file   Social History Narrative    Not on file       Vitals:    07/09/19 0802   BP: 106/63   Pulse: 83   Resp: 18   Temp: 98.3 °F (36.8 °C)       Physical Exam   Constitutional: He is oriented to person, place, and time. He appears well-developed and well-nourished.   Cardiovascular: Normal rate and regular rhythm.   Pulmonary/Chest: Effort normal. No respiratory distress.   Abdominal: Soft.   Genitourinary:   Genitourinary Comments: 1.5cm incision from 7/6 draining abscess; area of induration and relative  tenderness extends medially and anterior towards perineum; there seems to be additional abscess here   Musculoskeletal: He exhibits no edema.   Neurological: He is alert and oriented to person, place, and time.   Skin: Skin is warm and dry.   Psychiatric: He has a normal mood and affect. His behavior is normal.   Vitals reviewed.      Assessment & Plan:  65 yo with left gluteal abscess s/p drainage on 7/6/19 in ED being treated for pneumonia. He has DM and h/o of CLL.    - more extensive I&D at bedside (see procedure note)  - change dressing at least daily; more often as indicated by drainage  - we will continue to follow the patient    RUBIN Castorena MD   General Surgery, PGY-2  Pager: [phone]

## 2019-07-09 NOTE — DISCHARGE SUMMARY
[facility] Medicine  Discharge Summary      Patient Name: Kevin Mancilla  MRN: [id number]  Admission Date: 7/6/2019  Hospital Length of Stay: 3 days  Discharge Date and Time: 7/9/2019  4:40 PM  Attending Physician: Luis M Bullock MD   Discharging Provider: Luis M Bullock MD  Primary Care Provider: [facility]      HPI:   Kevin Mancilla is a 64 y.o. black man with former cigarette smoking, hypertension, hyperlipidemia, diabetes mellitus type 2, chronic kidney disease stage 3, glaucoma, chronic lymphocytic leukemia. He lives in [place], [place]. He is . His primary care clinic is the [facility] clinic in [place].   He presented to [facility] on 7/6/19 with an abscess on his left sacral area that he noticed 3 days prior, with erythema, pain, and swelling. Labs showed leukocytosis (WBC 43398), anemia (hemoglobin 8.3 and hematocrit 26.9, compared to 11.9 and 36.2 on 7/7/17). Chest X-ray showed infiltrates in the right middle lobe and left lower lobe concerning for pneumonia. He had cough but no shortness of breath or hypoxia. The abscess was incised and drained and the wound was packed with iodoform. [facility] was [facility].      Hospital Course:   The wound culture grew methicillin-resistant Staphylococcus aureus. He was kept several days to continue IV vancomycin. He was additionally given ceftriaxone and azithromycin for the pneumonia. On 7/9/19, General Surgery was consulted because the abscess appeared to need further drainage. The surgeon expressed more pus and repacked the wound. He was discharged home with prescriptions for trimethoprim-sulfamethoxazole, cefdinir, and home health for wound care.     Consults:   Consults (From admission, onward)        Status Ordering Provider     Inpatient consult to General Surgery  Once     Provider:  Misha MORATAYA  MD Micha    Completed LUC BEATTY     Inpatient consult to Registered Dietitian/Nutritionist  Once     Provider:  (Not yet assigned)    Completed MELINDA PEREZ consult to case management  Once     Provider:  (Not yet assigned)    Acknowledged MELINDA PEREZ        Final Active Diagnoses:    Diagnosis Date Noted POA    PRINCIPAL PROBLEM:  Abscess of left buttock [L02.31] 07/03/2017 Yes    Mild malnutrition [E44.1] 07/08/2019 Yes    Hypokalemia [E87.6] 07/07/2019 Yes     Chronic    Hypomagnesemia [E83.42] 07/07/2019 Yes    Pneumonia of both lungs due to infectious organism [J18.9] 07/06/2019 Yes    Essential hypertension [I10] 07/05/2017 Yes     Chronic    CLL (chronic lymphocytic leukemia) [C91.90] 07/05/2017 Yes     Chronic    Immunosuppressed status [D89.9] 07/05/2017 Yes     Chronic    Hyperlipidemia [E78.5] 07/05/2017 Yes     Chronic    Type 2 diabetes mellitus with stage 3 chronic kidney disease, with long-term current use of insulin [E11.22, N18.3, Z79.4] 07/03/2017 Not Applicable     Chronic      Problems Resolved During this Admission:       Discharged Condition: good    Disposition: Home-Health Care [facility]    Follow Up:  Follow-up Information     [facility].               Patient Instructions:      Ambulatory referral to Home Health   Referral Priority: Routine Referral Type: Home Health   Referral Reason: Specialty Services Required   Requested Specialty: Home Health Services   Number of Visits Requested: 1     Diet diabetic     Other restrictions (specify):   Order Comments: Do not submerge wound in water (no bath) until it has closed and healed     Change dressing (specify)   Order Comments: Dressing change: pack with 4 x 4 gauze daily.     Activity as tolerated       Significant Diagnostic Studies:   X-Ray Chest AP Portable 7/06/19: FINDINGS:  Left lower lobe patchy infiltrate.  Early infiltrate in the right upper lobe.  No effusion or  pneumothorax.  Cardiac silhouette mediastinum unremarkable.  Tortuous thoracic aorta.  Aerobic culture, abscess from left buttock 7/06/19:   Methicillin resistant staphylococcus aureus     CULTURE, AEROBIC  (SPECIFY SOURCE)     Clindamycin <=0.5 mcg/mL Sensitive     Erythromycin >4 mcg/mL Resistant     Oxacillin >2 mcg/mL Resistant     Penicillin >8 mcg/mL Resistant     Tetracycline <=4 mcg/mL Sensitive     Trimeth/Sulfa <=0.5/9.5 m... Sensitive     Vancomycin 1 mcg/mL Sensitive          Medications:  Reconciled Home Medications:      Medication List      START taking these medications    cefdinir 300 MG capsule  Commonly known as:  OMNICEF  Take 1 capsule (300 mg total) by mouth 2 (two) times daily. for 5 days     sulfamethoxazole-trimethoprim 800-160mg 800-160 mg Tab  Commonly known as:  BACTRIM DS  Take 1 tablet by mouth 2 (two) times daily. for 5 days        CONTINUE taking these medications    allopurinol 300 MG tablet  Commonly known as:  ZYLOPRIM  Take 300 mg by mouth once daily.     amLODIPine 10 MG tablet  Commonly known as:  NORVASC  Take 10 mg by mouth once daily.     aspirin 81 MG Chew  Take 81 mg by mouth once daily.     atorvastatin 20 MG tablet  Commonly known as:  LIPITOR  Take 20 mg by mouth once daily.     atovaquone 750 mg/5 mL Susp  Commonly known as:  MEPRON  Take by mouth.     docusate calcium 240 mg capsule  Commonly known as:  SURFAK  Take 240 mg by mouth 2 (two) times daily.     dorzolamide-timolol 2-0.5% 22.3-6.8 mg/mL ophthalmic solution  Commonly known as:  COSOPT  1 drop 2 (two) times daily.     entecavir 0.5 MG Tab  Commonly known as:  BARACLUDE  Take 0.5 mg by mouth once daily.     ibrutinib 140 mg Cap  Commonly known as:  IMBRUVICA  Take 420 mg by mouth once daily.     insulin  unit/mL injection  Inject into the skin 2 (two) times daily before meals. Takes as needed.     multivitamin-zinc gluconate 5 mg/mL Drop  Take by mouth.     potassium chloride 10 MEQ Tbsr  Commonly known  as:  KLOR-CON  Take 10 mEq by mouth once daily.            Indwelling Lines/Drains at time of discharge: None    Time spent on the discharge of patient: 35 minutes  Patient was seen and examined on the date of discharge and determined to be suitable for discharge.         Luis M Bullock MD  Department of Hospital Medicine  [facility]

## 2019-07-09 NOTE — PLAN OF CARE
VN note: VN cued into patient's room. Wife at bedside. VN reviewed discharge information with them. VN educated them on new medication and side effects. Medication list reviewed. Follow-up information given. VN educated patient and wife on infection prevention and when to seek medical attention. He was also notified of precautions at home, and informed bedside nurse also educated on wound care. Home Health will come out tomorrow. VN also provided education on pneumonia. They verbalized understanding and all questions answered. Refer to clinical references for further education. Prescriptions delivered at bedside. Transport requested.

## 2019-07-10 ENCOUNTER — PATIENT OUTREACH (OUTPATIENT)
Dept: ADMINISTRATIVE | Facility: CLINIC | Age: 64
End: 2019-07-10

## 2019-07-10 NOTE — PROCEDURES
"Kevin Mancilla is a 64 y.o. male patient.    Temp: 96.8 °F (36 °C) (07/09/19 1201)  Pulse: 62 (07/09/19 1201)  Resp: 16 (07/09/19 1201)  BP: 112/66 (07/09/19 1201)  SpO2: 99 % (07/09/19 1201)  Weight: 75.9 kg (167 lb 5.3 oz) (07/08/19 2335)  Height: 6' 1" (185.4 cm) (07/08/19 1500)      Procedure done with Dr. Muse at bedside.    Incision and Drainage  Date/Time: 7/9/2019 11:42 AM  Performed by: RUBIN Castorena MD  Authorized by: RUBIN Castorena MD   Pre-operative diagnosis: gluteal abscess  Post-operative diagnosis: gluteal abscess  Consent Done: Yes  Consent: Written consent obtained.  Risks and benefits: risks, benefits and alternatives were discussed  Consent given by: patient  Patient understanding: patient states understanding of the procedure being performed  Patient consent: the patient's understanding of the procedure matches consent given  Procedure consent: procedure consent matches procedure scheduled  Relevant documents: relevant documents present and verified  Patient identity confirmed: verbally with patient  Type: abscess  Body area: anogenital  Location details: gluteal cleft  Anesthesia: local infiltration    Anesthesia:  Local Anesthetic: lidocaine 1% with epinephrine  Anesthetic total: 5 mL  Patient sedated: no  Description of findings: indurated tissue without large collection; some pus was expressed after finger fracture of loculated abscess cavity; packed with 4x4   Scalpel size: 10  Incision type: single straight  Complexity: simple  Drainage: pus  Drainage amount: scant  Wound treatment: wound left open and  wound packed  Complications: No  Estimated blood loss (mL): 1  Specimens: No  Implants: No  Patient tolerance: Patient tolerated the procedure well with no immediate complications        RUBIN Castorena MD   General Surgery, PGY-2  Pager: [phone]      ELSY Castorena  7/9/2019    "

## 2019-07-10 NOTE — PATIENT INSTRUCTIONS
Abscess (Incision & Drainage)  An abscess is sometimes called a boil. It happens when bacteria get trapped under the skin and start to grow. Pus forms inside the abscess as the body responds to the bacteria. An abscess can happen with an insect bite, ingrown hair, blocked oil gland, pimple, cyst, or puncture wound.  Your healthcare provider has drained the pus from your abscess. If the abscess pocket was large, your healthcare provider may have put in gauze packing. Your provider will need to remove it on your next visit. He or she may also replace it at that time. You may not need antibiotics to treat a simple abscess, unless the infection is spreading into the skin around the wound (cellulitis).  The wound will take about 1 to 2 weeks to heal, depending on the size of the abscess. Healthy tissue will grow from the bottom and sides of the opening until it seals over.  Home care  These tips can help your wound heal:  · The wound may drain for the first 2 days. Cover the wound with a clean dry dressing. Change the dressing if it becomes soaked with blood or pus.  · If a gauze packing was placed inside the abscess pocket, you may be told to remove it yourself. You may do this in the shower. Once the packing is removed, you should wash the area in the shower, or clean the area as directed by your provider. Continue to do this until the skin opening has closed. Make sure you wash your hands after changing the packing or cleaning the wound.  · If you were prescribed antibiotics, take them as directed until they are all gone.  · You may use acetaminophen or ibuprofen to control pain, unless another pain medicine was prescribed. If you have liver disease or ever had a stomach ulcer, talk with your doctor before using these medicines.  Follow-up care  Follow up with your healthcare provider, or as advised. If a gauze packing was put in your wound, it should be removed in 1 to 2 days. Check your wound every day for any  signs that the infection is getting worse. The signs are listed below.  When to seek medical advice  Call your healthcare provider right away if any of these occur:  · Increasing redness or swelling  · Red streaks in the skin leading away from the wound  · Increasing local pain or swelling  · Continued pus draining from the wound 2 days after treatment  · Fever of 100.4ºF (38ºC) or higher, or as directed by your healthcare provider  · Boil returns when you are at home  Date Last Reviewed: 9/1/2016  © 8774-5733 [employer] [address] [place] [address]. All rights reserved. This information is not intended as a substitute for professional medical care. Always follow your healthcare professional's instructions.

## 2019-07-11 LAB — BACTERIA BLD CULT: NORMAL

## 2019-07-15 ENCOUNTER — OFFICE VISIT (OUTPATIENT)
Dept: SURGERY | Facility: CLINIC | Age: 64
End: 2019-07-15
Payer: MEDICARE

## 2019-07-15 VITALS
SYSTOLIC BLOOD PRESSURE: 112 MMHG | HEART RATE: 88 BPM | TEMPERATURE: 97 F | BODY MASS INDEX: 21.9 KG/M2 | HEIGHT: 73 IN | DIASTOLIC BLOOD PRESSURE: 72 MMHG | OXYGEN SATURATION: 100 % | WEIGHT: 165.25 LBS

## 2019-07-15 DIAGNOSIS — L02.31 ABSCESS OF LEFT BUTTOCK: Primary | ICD-10-CM

## 2019-07-15 PROCEDURE — 99213 OFFICE O/P EST LOW 20 MIN: CPT | Mod: PBBFAC,PO | Performed by: STUDENT IN AN ORGANIZED HEALTH CARE EDUCATION/TRAINING PROGRAM

## 2019-07-15 PROCEDURE — 99999 PR PBB SHADOW E&M-EST. PATIENT-LVL III: ICD-10-PCS | Mod: PBBFAC,,, | Performed by: STUDENT IN AN ORGANIZED HEALTH CARE EDUCATION/TRAINING PROGRAM

## 2019-07-15 PROCEDURE — 99024 POSTOP FOLLOW-UP VISIT: CPT | Mod: S$PBB,,, | Performed by: STUDENT IN AN ORGANIZED HEALTH CARE EDUCATION/TRAINING PROGRAM

## 2019-07-15 PROCEDURE — 99999 PR PBB SHADOW E&M-EST. PATIENT-LVL III: CPT | Mod: PBBFAC,,, | Performed by: STUDENT IN AN ORGANIZED HEALTH CARE EDUCATION/TRAINING PROGRAM

## 2019-07-15 PROCEDURE — 99024 PR POST-OP FOLLOW-UP VISIT: ICD-10-PCS | Mod: S$PBB,,, | Performed by: STUDENT IN AN ORGANIZED HEALTH CARE EDUCATION/TRAINING PROGRAM

## 2019-07-17 NOTE — PROGRESS NOTES
Patient ID: Kevin Mancilla is a 64 y.o. male.    Chief Complaint: No chief complaint on file.      HPI:  64M recently discharged from hospital. Seen by surgery for left gluteal abscess. S/p I&D at bedside while inpatient. Doing well  Ambulating well, minimal to no pain at this point. Drainage decreasing. No fevers.       Review of Systems   Constitutional: Negative for chills, diaphoresis and fever.   HENT: Negative for trouble swallowing.    Respiratory: Negative for cough, shortness of breath, wheezing and stridor.    Cardiovascular: Negative for chest pain and palpitations.   Gastrointestinal: Negative for abdominal distention, abdominal pain, blood in stool, diarrhea, nausea and vomiting.   Endocrine: Negative for cold intolerance and heat intolerance.   Genitourinary: Negative for difficulty urinating.   Musculoskeletal: Negative for back pain.   Skin: Positive for wound. Negative for rash.   Allergic/Immunologic: Negative for immunocompromised state.   Neurological: Negative for dizziness, syncope and numbness.   Hematological: Negative for adenopathy.   Psychiatric/Behavioral: Negative for agitation.       Current Outpatient Medications   Medication Sig Dispense Refill    allopurinol (ZYLOPRIM) 300 MG tablet Take 300 mg by mouth once daily.      amlodipine (NORVASC) 10 MG tablet Take 10 mg by mouth once daily.      aspirin 81 MG Chew Take 81 mg by mouth once daily.      docusate calcium (SURFAK) 240 mg capsule Take 240 mg by mouth 2 (two) times daily.      dorzolamide-timolol 2-0.5% (COSOPT) 22.3-6.8 mg/mL ophthalmic solution 1 drop 2 (two) times daily.      entecavir (BARACLUDE) 0.5 MG Tab Take 0.5 mg by mouth once daily.      ibrutinib 140 mg Cap Take 420 mg by mouth once daily.       insulin NPH (NOVOLIN N) 100 unit/mL injection Inject into the skin 2 (two) times daily before meals. Takes as needed.      multivitamin-zinc gluconate 5 mg/mL Drop Take by mouth.      atorvastatin (LIPITOR) 20 MG  tablet Take 20 mg by mouth once daily.      atovaquone (MEPRON) 750 mg/5 mL Susp Take by mouth.      potassium chloride (KLOR-CON) 10 MEQ TbSR Take 10 mEq by mouth once daily.       No current facility-administered medications for this visit.        Review of patient's allergies indicates:  No Known Allergies    Past Medical History:   Diagnosis Date    Chronic kidney disease, stage 3     CLL (chronic lymphocytic leukemia)     Diabetes mellitus, type 2     Encounter for blood transfusion     Glaucoma     High cholesterol     Hypertension        History reviewed. No pertinent surgical history.    Family History   Problem Relation Age of Onset    No Known Problems Mother     No Known Problems Father        Social History     Socioeconomic History    Marital status:      Spouse name: Not on file    Number of children: Not on file    Years of education: Not on file    Highest education level: Not on file   Occupational History    Not on file   Social Needs    Financial resource strain: Not on file    Food insecurity:     Worry: Not on file     Inability: Not on file    Transportation needs:     Medical: Not on file     Non-medical: Not on file   Tobacco Use    Smoking status: Former Smoker    Smokeless tobacco: Never Used   Substance and Sexual Activity    Alcohol use: No    Drug use: No    Sexual activity: Not on file   Lifestyle    Physical activity:     Days per week: Not on file     Minutes per session: Not on file    Stress: Not on file   Relationships    Social connections:     Talks on phone: Not on file     Gets together: Not on file     Attends Holiness service: Not on file     Active member of club or organization: Not on file     Attends meetings of clubs or organizations: Not on file     Relationship status: Not on file   Other Topics Concern    Not on file   Social History Narrative    Not on file       Vitals:    07/15/19 0841   BP: 112/72   Pulse: 88   Temp: 97.1 °F  (36.2 °C)       Physical Exam   Constitutional: He is oriented to person, place, and time. He appears well-nourished. No distress.   HENT:   Head: Normocephalic and atraumatic.   Eyes: No scleral icterus.   Cardiovascular: Normal rate.   Pulmonary/Chest: Effort normal. No stridor. No respiratory distress.   Abdominal: Soft. There is no tenderness.   Lymphadenopathy:     He has no cervical adenopathy.   Neurological: He is alert and oriented to person, place, and time.   Skin: Skin is warm. No erythema.        Psychiatric: He has a normal mood and affect. His behavior is normal.       Assessment & Plan:   64F s/p I&D left gluteal abscess  Healing well, infection resolved  RTC in 2 week for wound check

## 2019-07-19 ENCOUNTER — TELEPHONE (OUTPATIENT)
Dept: SURGERY | Facility: CLINIC | Age: 64
End: 2019-07-19

## 2019-07-19 NOTE — TELEPHONE ENCOUNTER
----- Message from Anna Denson sent at 7/19/2019 11:38 AM CDT -----  Contact: [phone]/ pts amaury Ritchieda   Called in requesting to speak with nurse regarding if gauze or bandage is needed on wound. Please advise.     7/19/19  11:45am  Returned patient's call regarding above message. No answer. Message left via voicemail advising patent on issue.

## 2019-07-25 ENCOUNTER — TELEPHONE (OUTPATIENT)
Dept: SURGERY | Facility: CLINIC | Age: 64
End: 2019-07-25

## 2019-07-25 NOTE — TELEPHONE ENCOUNTER
----- Message from Irma Lake sent at 7/25/2019  8:28 AM CDT -----  Patient's wife, Yamile, called.   No. [phone]   Patient's wound has an odor and looks infected.  He needs an appointment today.   Please call.        7/25/19  9:13am  Spoke to patient's wife regarding above issue. Patient scheduled to see Dr. Luis this afternoon. Verbalized understanding.

## 2019-07-26 ENCOUNTER — OFFICE VISIT (OUTPATIENT)
Dept: SURGERY | Facility: CLINIC | Age: 64
End: 2019-07-26
Payer: MEDICARE

## 2019-07-26 VITALS
HEART RATE: 110 BPM | WEIGHT: 164 LBS | TEMPERATURE: 98 F | BODY MASS INDEX: 21.74 KG/M2 | DIASTOLIC BLOOD PRESSURE: 70 MMHG | HEIGHT: 73 IN | SYSTOLIC BLOOD PRESSURE: 103 MMHG

## 2019-07-26 DIAGNOSIS — L02.31 ABSCESS, GLUTEAL, LEFT: Primary | ICD-10-CM

## 2019-07-26 PROCEDURE — 99024 POSTOP FOLLOW-UP VISIT: CPT | Mod: POP,,, | Performed by: SURGERY

## 2019-07-26 PROCEDURE — 99999 PR PBB SHADOW E&M-EST. PATIENT-LVL III: CPT | Mod: PBBFAC,,, | Performed by: SURGERY

## 2019-07-26 PROCEDURE — 99213 OFFICE O/P EST LOW 20 MIN: CPT | Mod: PBBFAC,PO | Performed by: SURGERY

## 2019-07-26 PROCEDURE — 99999 PR PBB SHADOW E&M-EST. PATIENT-LVL III: ICD-10-PCS | Mod: PBBFAC,,, | Performed by: SURGERY

## 2019-07-26 PROCEDURE — 99024 PR POST-OP FOLLOW-UP VISIT: ICD-10-PCS | Mod: POP,,, | Performed by: SURGERY

## 2019-07-26 RX ORDER — SULFAMETHOXAZOLE AND TRIMETHOPRIM 800; 160 MG/1; MG/1
1 TABLET ORAL 2 TIMES DAILY
Qty: 28 TABLET | Refills: 0 | Status: SHIPPED | OUTPATIENT
Start: 2019-07-26 | End: 2019-08-09

## 2019-07-26 NOTE — PROGRESS NOTES
Patient ID: Kevin Mancilla is a 64 y.o. male.    Chief Complaint: No chief complaint on file.      HPI:  64M recently discharged from hospital. Seen by surgery for left gluteal abscess. S/p I&D at bedside while inpatient. Was hospitalized with pneumonia at the time. [facility] Taking PO ibrutinib now.    7/15/19 - Doing well. Ambulating well, minimal to no pain at this point. Drainage decreasing. No fevers.     7/26/2019 - Returns to clinic. Fever yesterday to 101.3. Change in odor from wound, which is foul. Has been doing sitz baths for 15-30 min at a time. Feels that his respiratory status has not improved back to baseline after his admission for pneumonia about three weeks ago.      Review of Systems   Constitutional: Negative for chills, diaphoresis and fever.   HENT: Negative for trouble swallowing.    Respiratory: Negative for cough, shortness of breath, wheezing and stridor.    Cardiovascular: Negative for chest pain and palpitations.   Gastrointestinal: Negative for abdominal distention, abdominal pain, blood in stool, diarrhea, nausea and vomiting.   Endocrine: Negative for cold intolerance and heat intolerance.   Genitourinary: Negative for difficulty urinating.   Musculoskeletal: Negative for back pain.   Skin: Positive for wound. Negative for rash.   Allergic/Immunologic: Negative for immunocompromised state.   Neurological: Negative for dizziness, syncope and numbness.   Hematological: Negative for adenopathy.   Psychiatric/Behavioral: Negative for agitation.       Current Outpatient Medications   Medication Sig Dispense Refill    allopurinol (ZYLOPRIM) 300 MG tablet Take 300 mg by mouth once daily.      amlodipine (NORVASC) 10 MG tablet Take 10 mg by mouth once daily.      aspirin 81 MG Chew Take 81 mg by mouth once daily.      atorvastatin (LIPITOR) 20 MG tablet Take 20 mg by mouth once daily.      atovaquone (MEPRON) 750 mg/5 mL Susp Take by mouth.       docusate calcium (SURFAK) 240 mg capsule Take 240 mg by mouth 2 (two) times daily.      dorzolamide-timolol 2-0.5% (COSOPT) 22.3-6.8 mg/mL ophthalmic solution 1 drop 2 (two) times daily.      entecavir (BARACLUDE) 0.5 MG Tab Take 0.5 mg by mouth once daily.      ibrutinib 140 mg Cap Take 420 mg by mouth once daily.       insulin NPH (NOVOLIN N) 100 unit/mL injection Inject into the skin 2 (two) times daily before meals. Takes as needed.      multivitamin-zinc gluconate 5 mg/mL Drop Take by mouth.      potassium chloride (KLOR-CON) 10 MEQ TbSR Take 10 mEq by mouth once daily.      sulfamethoxazole-trimethoprim 800-160mg (BACTRIM DS) 800-160 mg Tab Take 1 tablet by mouth 2 (two) times daily. for 14 days 28 tablet 0     No current facility-administered medications for this visit.        Review of patient's allergies indicates:  No Known Allergies    Past Medical History:   Diagnosis Date    Chronic kidney disease, stage 3     CLL (chronic lymphocytic leukemia)     Diabetes mellitus, type 2     Encounter for blood transfusion     Glaucoma     High cholesterol     Hypertension        No past surgical history on file.    Family History   Problem Relation Age of Onset    No Known Problems Mother     No Known Problems Father        Social History     Socioeconomic History    Marital status:      Spouse name: Not on file    Number of children: Not on file    Years of education: Not on file    Highest education level: Not on file   Occupational History    Not on file   Social Needs    Financial resource strain: Not on file    Food insecurity:     Worry: Not on file     Inability: Not on file    Transportation needs:     Medical: Not on file     Non-medical: Not on file   Tobacco Use    Smoking status: Former Smoker    Smokeless tobacco: Never Used   Substance and Sexual Activity    Alcohol use: No    Drug use: No    Sexual activity: Not on file   Lifestyle    Physical activity:     Days  per week: Not on file     Minutes per session: Not on file    Stress: Not on file   Relationships    Social connections:     Talks on phone: Not on file     Gets together: Not on file     Attends Faith service: Not on file     Active member of club or organization: Not on file     Attends meetings of clubs or organizations: Not on file     Relationship status: Not on file   Other Topics Concern    Not on file   Social History Narrative    Not on file       Vitals:    07/26/19 1115   BP: 103/70   Pulse: 110   Temp: 98.4 °F (36.9 °C)       Physical Exam   Constitutional: He is oriented to person, place, and time. He appears well-nourished. No distress.   HENT:   Head: Normocephalic and atraumatic.   Eyes: No scleral icterus.   Cardiovascular: Normal rate.   Pulmonary/Chest: Effort normal. No stridor. No respiratory distress.   Abdominal: Soft. There is no tenderness.   Lymphadenopathy:     He has no cervical adenopathy.   Neurological: He is alert and oriented to person, place, and time.   Skin: Skin is warm. No erythema.        Psychiatric: He has a normal mood and affect. His behavior is normal.   Vitals reviewed.      Assessment & Plan:   64F s/p I&D left gluteal abscess in early July. Initially did well, but after stopping antibiotics he has increased drainage, fevers, chills. Also, doesn't feel like his respiratory status is improving following discharge.    - gentle debridement of the wound in clinic  - instructed on dry gauze to wound bed; change as needed; more than once a day is OK if dressing is saturated  - no more sitz bath unless pain  - 14-day course of bactrim  - [facility] to see if [facility] would like [facility] the patient based on fevers and resp status; requested to call our clinic if he is not able to get in touch with them today    RUBIN Castorena MD   General Surgery, PGY-2  Pager: [phone]

## 2019-08-02 ENCOUNTER — OFFICE VISIT (OUTPATIENT)
Dept: SURGERY | Facility: CLINIC | Age: 64
End: 2019-08-02
Payer: MEDICARE

## 2019-08-02 VITALS
DIASTOLIC BLOOD PRESSURE: 67 MMHG | SYSTOLIC BLOOD PRESSURE: 122 MMHG | HEART RATE: 117 BPM | WEIGHT: 163.13 LBS | BODY MASS INDEX: 21.62 KG/M2 | TEMPERATURE: 102 F | HEIGHT: 73 IN

## 2019-08-02 DIAGNOSIS — L02.31 ABSCESS OF LEFT BUTTOCK: Primary | ICD-10-CM

## 2019-08-02 PROCEDURE — 99213 OFFICE O/P EST LOW 20 MIN: CPT | Mod: PBBFAC,PO | Performed by: STUDENT IN AN ORGANIZED HEALTH CARE EDUCATION/TRAINING PROGRAM

## 2019-08-02 PROCEDURE — 99999 PR PBB SHADOW E&M-EST. PATIENT-LVL III: CPT | Mod: PBBFAC,,, | Performed by: STUDENT IN AN ORGANIZED HEALTH CARE EDUCATION/TRAINING PROGRAM

## 2019-08-02 PROCEDURE — 99213 PR OFFICE/OUTPT VISIT, EST, LEVL III, 20-29 MIN: ICD-10-PCS | Mod: S$PBB,,, | Performed by: STUDENT IN AN ORGANIZED HEALTH CARE EDUCATION/TRAINING PROGRAM

## 2019-08-02 PROCEDURE — 99213 OFFICE O/P EST LOW 20 MIN: CPT | Mod: S$PBB,,, | Performed by: STUDENT IN AN ORGANIZED HEALTH CARE EDUCATION/TRAINING PROGRAM

## 2019-08-02 PROCEDURE — 99999 PR PBB SHADOW E&M-EST. PATIENT-LVL III: ICD-10-PCS | Mod: PBBFAC,,, | Performed by: STUDENT IN AN ORGANIZED HEALTH CARE EDUCATION/TRAINING PROGRAM

## 2019-08-02 NOTE — PROGRESS NOTES
Patient ID: Kevin Mancilla is a 64 y.o. male.    Chief Complaint: No chief complaint on file.      HPI:  64M recently discharged from hospital. Seen by surgery for left gluteal abscess. S/p I&D at bedside while inpatient. Was hospitalized with pneumonia at the time. [facility]. Taking PO ibrutinib now.    7/15/19 - Doing well. Ambulating well, minimal to no pain at this point. Drainage decreasing. No fevers.     7/26/2019 - Returns to clinic. Fever yesterday to 101.3. Change in odor from wound, which is foul. Has been doing sitz baths for 15-30 min at a time. Feels that his respiratory status has not improved back to baseline after his admission for pneumonia about three weeks ago.    8/2 - pain is slowly improving. Still complaining of cough, on bactrim.       Review of Systems   Constitutional: Negative for chills, diaphoresis and fever.   HENT: Negative for trouble swallowing.    Respiratory: Negative for cough, shortness of breath, wheezing and stridor.    Cardiovascular: Negative for chest pain and palpitations.   Gastrointestinal: Negative for abdominal distention, abdominal pain, blood in stool, diarrhea, nausea and vomiting.   Endocrine: Negative for cold intolerance and heat intolerance.   Genitourinary: Negative for difficulty urinating.   Musculoskeletal: Negative for back pain.   Skin: Positive for wound. Negative for rash.   Allergic/Immunologic: Negative for immunocompromised state.   Neurological: Negative for dizziness, syncope and numbness.   Hematological: Negative for adenopathy.   Psychiatric/Behavioral: Negative for agitation.       Current Outpatient Medications   Medication Sig Dispense Refill    allopurinol (ZYLOPRIM) 300 MG tablet Take 300 mg by mouth once daily.      amlodipine (NORVASC) 10 MG tablet Take 10 mg by mouth once daily.      aspirin 81 MG Chew Take 81 mg by mouth once daily.      atorvastatin (LIPITOR) 20 MG tablet Take 20 mg by mouth  once daily.      atovaquone (MEPRON) 750 mg/5 mL Susp Take by mouth.      docusate calcium (SURFAK) 240 mg capsule Take 240 mg by mouth 2 (two) times daily.      dorzolamide-timolol 2-0.5% (COSOPT) 22.3-6.8 mg/mL ophthalmic solution 1 drop 2 (two) times daily.      entecavir (BARACLUDE) 0.5 MG Tab Take 0.5 mg by mouth once daily.      ibrutinib 140 mg Cap Take 420 mg by mouth once daily.       insulin NPH (NOVOLIN N) 100 unit/mL injection Inject into the skin 2 (two) times daily before meals. Takes as needed.      multivitamin-zinc gluconate 5 mg/mL Drop Take by mouth.      potassium chloride (KLOR-CON) 10 MEQ TbSR Take 10 mEq by mouth once daily.      sulfamethoxazole-trimethoprim 800-160mg (BACTRIM DS) 800-160 mg Tab Take 1 tablet by mouth 2 (two) times daily. for 14 days 28 tablet 0     No current facility-administered medications for this visit.        Review of patient's allergies indicates:  No Known Allergies    Past Medical History:   Diagnosis Date    Chronic kidney disease, stage 3     CLL (chronic lymphocytic leukemia)     Diabetes mellitus, type 2     Encounter for blood transfusion     Glaucoma     High cholesterol     Hypertension        History reviewed. No pertinent surgical history.    Family History   Problem Relation Age of Onset    No Known Problems Mother     No Known Problems Father        Social History     Socioeconomic History    Marital status:      Spouse name: Not on file    Number of children: Not on file    Years of education: Not on file    Highest education level: Not on file   Occupational History    Not on file   Social Needs    Financial resource strain: Not on file    Food insecurity:     Worry: Not on file     Inability: Not on file    Transportation needs:     Medical: Not on file     Non-medical: Not on file   Tobacco Use    Smoking status: Former Smoker    Smokeless tobacco: Never Used   Substance and Sexual Activity    Alcohol use: No     Drug use: No    Sexual activity: Not on file   Lifestyle    Physical activity:     Days per week: Not on file     Minutes per session: Not on file    Stress: Not on file   Relationships    Social connections:     Talks on phone: Not on file     Gets together: Not on file     Attends Gnosticism service: Not on file     Active member of club or organization: Not on file     Attends meetings of clubs or organizations: Not on file     Relationship status: Not on file   Other Topics Concern    Not on file   Social History Narrative    Not on file       Vitals:    08/02/19 1342   BP: 122/67   Pulse: (!) 117   Temp: (!) 101.7 °F (38.7 °C)       Physical Exam   Constitutional: He is oriented to person, place, and time. He appears well-nourished. No distress.   HENT:   Head: Normocephalic and atraumatic.   Eyes: No scleral icterus.   Cardiovascular: Normal rate.   Pulmonary/Chest: Effort normal. No stridor. No respiratory distress.   Abdominal: Soft. There is no tenderness.   Genitourinary:         Lymphadenopathy:     He has no cervical adenopathy.   Neurological: He is alert and oriented to person, place, and time.   Skin: Skin is warm. No erythema.        Psychiatric: He has a normal mood and affect. His behavior is normal.   Vitals reviewed.      Assessment & Plan:   64F s/p I&D left gluteal abscess in early July.   - bactrim  - Mauricio baths ok  - Has appointment with oncologist next week

## 2024-11-07 NOTE — TELEPHONE ENCOUNTER
----- Message from Melissa Hester sent at 7/28/2017 11:06 AM CDT -----  Contact: Julia Contreras-  Julia faxed a prescription over yesterday for supplies for the doctors signature. Call Julia with [facility] when the prescription for supplies is signed by the doctor and she will come pick it up. Julia's call back number is [phone]   
Returned Christines call to inform that form signed by MD and faxed to number on form. She verbalizes understanding.   
Initiate Treatment: fluorouracil 5 % topical cream Twice daily\\nQuantity: 40.0 g\\nSig: Apply a thin layer to chest twice daily for 2 weeks. May cause sun sensitivity. Keep away from children and pets.
Detail Level: Simple
Render In Strict Bullet Format?: No